# Patient Record
Sex: FEMALE | Race: WHITE | NOT HISPANIC OR LATINO | Employment: FULL TIME | URBAN - METROPOLITAN AREA
[De-identification: names, ages, dates, MRNs, and addresses within clinical notes are randomized per-mention and may not be internally consistent; named-entity substitution may affect disease eponyms.]

---

## 2023-10-17 ENCOUNTER — OFFICE VISIT (OUTPATIENT)
Dept: OBSTETRICS AND GYNECOLOGY | Age: 23
End: 2023-10-17
Payer: COMMERCIAL

## 2023-10-17 VITALS
SYSTOLIC BLOOD PRESSURE: 120 MMHG | WEIGHT: 161 LBS | HEIGHT: 66 IN | BODY MASS INDEX: 25.88 KG/M2 | DIASTOLIC BLOOD PRESSURE: 80 MMHG

## 2023-10-17 DIAGNOSIS — Z32.01 POSITIVE URINE PREGNANCY TEST: ICD-10-CM

## 2023-10-17 DIAGNOSIS — Z13.9 SPECIAL SCREENING: ICD-10-CM

## 2023-10-17 DIAGNOSIS — N92.6 MISSED MENSES: ICD-10-CM

## 2023-10-17 DIAGNOSIS — Z01.419 ENCOUNTER FOR GYNECOLOGICAL EXAMINATION WITHOUT ABNORMAL FINDING: Primary | ICD-10-CM

## 2023-10-17 DIAGNOSIS — Z12.4 SCREENING FOR MALIGNANT NEOPLASM OF THE CERVIX: ICD-10-CM

## 2023-10-17 DIAGNOSIS — Z11.3 SCREEN FOR STD (SEXUALLY TRANSMITTED DISEASE): ICD-10-CM

## 2023-10-17 LAB
B-HCG UR QL: POSITIVE
EXPIRATION DATE: ABNORMAL
INTERNAL NEGATIVE CONTROL: NEGATIVE
INTERNAL POSITIVE CONTROL: ABNORMAL
Lab: ABNORMAL

## 2023-10-17 NOTE — PROGRESS NOTES
"Jadiel Gardner is a 23 y.o. female presents as new patient here today for annual visit and to establish care ,last pap @ [facility] unknown date approximately 1 yr ago according to patient.  Previously on bcp and patch  did not like it - bloating . Periods are irregular  duration 4 days , patient is SA .Patient undecided on contraception , mom is a patient here with you .  Patient's pregnancy test is positive here today.  Ultrasound reveals a thickened endometrial stripe of 19 mm, normal adnexa, normal size uterus.  We will check a quant today and call patient with a plan of care.      History of Present Illness    The following portions of the patient's history were reviewed and updated as appropriate: allergies, current medications, past family history, past medical history, past social history, past surgical history, and problem list.    Review of Systems   Constitutional:  Negative for chills, fatigue and fever.   Gastrointestinal:  Negative for abdominal distention and abdominal pain.   Genitourinary:  Negative for dyspareunia, dysuria, menstrual problem, pelvic pain, vaginal bleeding, vaginal discharge and vaginal pain.   All other systems reviewed and are negative.    /80   Ht 167.6 cm (66\")   Wt 73 kg (161 lb)   LMP 08/24/2023 (Approximate)   BMI 25.99 kg/m²     Objective   Physical Exam  Vitals and nursing note reviewed.   Constitutional:       Appearance: Normal appearance. She is well-developed and normal weight.   Neck:      Thyroid: No thyromegaly.   Cardiovascular:      Rate and Rhythm: Normal rate.   Pulmonary:      Effort: Pulmonary effort is normal.   Chest:   Breasts:     Right: No mass, nipple discharge, skin change or tenderness.      Left: No mass, nipple discharge, skin change or tenderness.   Abdominal:      General: There is no distension.      Palpations: Abdomen is soft.      Tenderness: There is no abdominal tenderness.   Genitourinary:     General: Normal vulva.      " Exam position: Lithotomy position.      Labia:         Right: No rash or lesion.         Left: No rash or lesion.       Vagina: Normal. No vaginal discharge or bleeding.      Cervix: No friability, lesion or cervical bleeding.      Uterus: Not enlarged and not tender.       Adnexa:         Right: No mass or tenderness.          Left: No mass or tenderness.     Musculoskeletal:         General: Normal range of motion.      Cervical back: Normal range of motion.   Skin:     General: Skin is warm and dry.      Findings: No rash.   Neurological:      Mental Status: She is alert and oriented to person, place, and time.   Psychiatric:         Mood and Affect: Mood normal.         Behavior: Behavior normal.           Assessment & Plan   Diagnoses and all orders for this visit:    1. Encounter for gynecological examination without abnormal finding (Primary)    2. Screening for malignant neoplasm of the cervix  -     IGP, Rfx Aptima HPV ASCU    3. Special screening  -     POC Pregnancy, Urine    4. Missed menses  -     HCG, B-subunit, Quantitative ([facility] Only)  -     Type & Screen    5. Screen for STD (sexually transmitted disease)  -     NuSwab VG+ - Swab, Vagina    6. Positive urine pregnancy test        Counseling was given to patient and boyfriend  for the following topics: diagnostic results, instructions for management, impressions, importance of treatment compliance, and self breast exams  .   No follow-ups on file.

## 2023-10-18 DIAGNOSIS — N92.6 MISSED MENSES: Primary | ICD-10-CM

## 2023-10-18 LAB — HCG INTACT+B SERPL-ACNC: 683 MIU/ML

## 2023-10-18 NOTE — PROGRESS NOTES
Call patient and notify that she is pregnant.  Can she repeat her quant tomorrow in [place]?  Lab order is in.

## 2023-10-19 ENCOUNTER — LAB (OUTPATIENT)
Dept: OBSTETRICS AND GYNECOLOGY | Age: 23
End: 2023-10-19
Payer: COMMERCIAL

## 2023-10-19 DIAGNOSIS — N92.6 MISSED MENSES: ICD-10-CM

## 2023-10-19 LAB
A VAGINAE DNA VAG QL NAA+PROBE: NORMAL SCORE
BVAB2 DNA VAG QL NAA+PROBE: NORMAL SCORE
C ALBICANS DNA VAG QL NAA+PROBE: NEGATIVE
C GLABRATA DNA VAG QL NAA+PROBE: NEGATIVE
C TRACH DNA VAG QL NAA+PROBE: NEGATIVE
MEGA1 DNA VAG QL NAA+PROBE: NORMAL SCORE
N GONORRHOEA DNA VAG QL NAA+PROBE: NEGATIVE
T VAGINALIS DNA VAG QL NAA+PROBE: NEGATIVE

## 2023-10-20 LAB
CONV .: NORMAL
CYTOLOGIST CVX/VAG CYTO: NORMAL
CYTOLOGY CVX/VAG DOC CYTO: NORMAL
CYTOLOGY CVX/VAG DOC THIN PREP: NORMAL
DX ICD CODE: NORMAL
HCG INTACT+B SERPL-ACNC: 1651 MIU/ML
HIV 1 & 2 AB SER-IMP: NORMAL
Lab: NORMAL
OTHER STN SPEC: NORMAL
STAT OF ADQ CVX/VAG CYTO-IMP: NORMAL

## 2023-10-20 NOTE — PROGRESS NOTES
Call patient and notify that quant is rising appropriately.  Please schedule confirmation visit with NP and transvaginal ultrasound week of November 6

## 2023-10-23 ENCOUNTER — TELEPHONE (OUTPATIENT)
Dept: OBSTETRICS AND GYNECOLOGY | Age: 23
End: 2023-10-23
Payer: COMMERCIAL

## 2023-11-07 ENCOUNTER — TELEPHONE (OUTPATIENT)
Dept: OBSTETRICS AND GYNECOLOGY | Age: 23
End: 2023-11-07

## 2023-11-07 NOTE — TELEPHONE ENCOUNTER
Caller: Yumi Gardner    Relationship: Self    Best call back number: [phone]    What is the best time to reach you: ANYTIME    Who are you requesting to speak with (clinical staff, provider,  specific staff member): GORDON    Do you know the name of the person who called: GORDON    What was the call regarding: NOT SURE

## 2023-11-16 ENCOUNTER — OFFICE VISIT (OUTPATIENT)
Dept: OBSTETRICS AND GYNECOLOGY | Age: 23
End: 2023-11-16
Payer: COMMERCIAL

## 2023-11-16 VITALS
DIASTOLIC BLOOD PRESSURE: 68 MMHG | WEIGHT: 160 LBS | SYSTOLIC BLOOD PRESSURE: 118 MMHG | BODY MASS INDEX: 25.71 KG/M2 | HEIGHT: 66 IN

## 2023-11-16 DIAGNOSIS — R11.0 NAUSEA WITHOUT VOMITING: ICD-10-CM

## 2023-11-16 DIAGNOSIS — Z3A.08 8 WEEKS GESTATION OF PREGNANCY: Primary | ICD-10-CM

## 2023-11-16 RX ORDER — DOXYLAMINE SUCCINATE AND PYRIDOXINE HYDROCHLORIDE 20; 20 MG/1; MG/1
1 TABLET, EXTENDED RELEASE ORAL 2 TIMES DAILY
Qty: 60 TABLET | Refills: 3 | Status: SHIPPED | OUTPATIENT
Start: 2023-11-16

## 2023-11-16 NOTE — PROGRESS NOTES
[facility]   Obstetrics and Gynecology   New Gynecology Visit    2023    Patient: Yumi Gardner          MR#:[id number]    History of Present Illness    Chief Complaint   Patient presents with    Possible Pregnancy     Pregnancy confirmation, LMP unknown, no complaints       Patient plans to see Dr. Knox     23 y.o. female  who presents for confirmation and viability of pregnancy last pap 10/17/23 NIL does not want genetic testing has a history or irregular periods unsure of exact LMP  Shalom boyfriend fob  had a heart murmur found in the [employer] no complications  Hearing loss on maternal side 3 cousins are deaf  She is a   They have a cat at home  FOB's family planning gender reveal find out with anatomy  Taking some prenatal vitamins however they made her really nauseous she plans to try another one OTC  She also competes in [employer]     Studies reviewed:      Obstetric History:  OB History          0    Para   0    Term   0       0    AB   0    Living   0         SAB   0    IAB   0    Ectopic   0    Molar        Multiple   0    Live Births                   Menstrual History:     Patient's last menstrual period was 2023 (approximate).       Sexual History:         Social History:    ________________________________________  Patient Active Problem List   Diagnosis    Missed menses    Positive urine pregnancy test     Past Medical History:   Diagnosis Date    Anxiety 2016    Healthy adult on routine physical examination      Past Surgical History:   Procedure Laterality Date    NO PAST SURGERIES      TONSILLECTOMY      WISDOM TOOTH EXTRACTION Bilateral      Social History     Tobacco Use   Smoking Status Never   Smokeless Tobacco Never     Family History   Problem Relation Age of Onset    No Known Problems Father     No Known Problems Mother     Hearing loss Cousin     Hearing loss Cousin     Breast cancer Neg Hx     Ovarian cancer Neg Hx     Uterine cancer  "Neg Hx     Colon cancer Neg Hx      Prior to Admission medications    Not on File     ________________________________________    The following portions of the patient's history were reviewed and updated as appropriate: allergies, current medications, past family history, past medical history, past social history, past surgical history, and problem list.    Review of Systems         Objective     /68   Ht 167.6 cm (66\")   Wt 72.6 kg (160 lb)   LMP 08/24/2023 (Approximate)   BMI 25.82 kg/m²    BP Readings from Last 3 Encounters:   11/16/23 118/68   10/17/23 120/80   05/12/23 158/89      Wt Readings from Last 3 Encounters:   11/16/23 72.6 kg (160 lb)   10/17/23 73 kg (161 lb)   05/12/23 63.5 kg (140 lb)        BMI: Estimated body mass index is 25.82 kg/m² as calculated from the following:    Height as of this encounter: 167.6 cm (66\").    Weight as of this encounter: 72.6 kg (160 lb).    Physical Exam            Assessment:  Diagnoses and all orders for this visit:    1. 8 weeks gestation of pregnancy (Primary)  -     ABO / Rh  -     RPR  -     Hepatitis B Surface Antigen  -     Rubella Antibody, IgG  -     Hepatitis C Antibody  -     Antibody Screen  -     Varicella Zoster Antibody, IgG  -     CBC (No Diff)  -     Hemoglobin A1c  -     HIV-1 / O / 2 Ag / Antibody  -     Hemoglobinopathy Fractionation Cascade  -     Chlamydia trachomatis, Neisseria gonorrhoeae, Trichomonas vaginalis, PCR - Swab, Vagina  -     Urine Culture - Urine, Urine, Clean Catch    2. Nausea without vomiting  -     doxylamine-pyridoxine ER (Bonjesta) 20-20 MG tablet controlled-release tablet; Take 1 tablet by mouth 2 (Two) Times a Day.  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 Discussed that the COVID and Flu vaccine is safe and recommended in pregnancy.   SAB warnings reviewed  All questions answered  No follow-ups on file.      Jyoti Olivia, APRN  11/16/2023 15:47 EST

## 2023-11-17 LAB
ABO GROUP BLD: NORMAL
BLD GP AB SCN SERPL QL: NEGATIVE
ERYTHROCYTE [DISTWIDTH] IN BLOOD BY AUTOMATED COUNT: 12.6 % (ref 11.7–15.4)
HBA1C MFR BLD: 5.3 % (ref 4.8–5.6)
HBV SURFACE AG SERPL QL IA: NEGATIVE
HCT VFR BLD AUTO: 38.5 % (ref 34–46.6)
HCV IGG SERPL QL IA: NON REACTIVE
HGB A MFR BLD ELPH: 97.4 % (ref 96.4–98.8)
HGB A2 MFR BLD ELPH: 2.6 % (ref 1.8–3.2)
HGB BLD-MCNC: 12.9 G/DL (ref 11.1–15.9)
HGB F MFR BLD ELPH: 0 % (ref 0–2)
HGB FRACT BLD-IMP: NORMAL
HGB S MFR BLD ELPH: 0 %
HIV 1+2 AB+HIV1 P24 AG SERPL QL IA: NON REACTIVE
MCH RBC QN AUTO: 29.5 PG (ref 26.6–33)
MCHC RBC AUTO-ENTMCNC: 33.5 G/DL (ref 31.5–35.7)
MCV RBC AUTO: 88 FL (ref 79–97)
PLATELET # BLD AUTO: 314 X10E3/UL (ref 150–450)
RBC # BLD AUTO: 4.38 X10E6/UL (ref 3.77–5.28)
RH BLD: POSITIVE
RPR SER QL: NON REACTIVE
RUBV IGG SERPL IA-ACNC: <0.9 INDEX
VZV IGG SER IA-ACNC: <135 INDEX
WBC # BLD AUTO: 9.3 X10E3/UL (ref 3.4–10.8)

## 2023-11-18 LAB
BACTERIA UR CULT: NORMAL
BACTERIA UR CULT: NORMAL

## 2023-11-20 LAB
C TRACH RRNA SPEC QL NAA+PROBE: NEGATIVE
N GONORRHOEA RRNA SPEC QL NAA+PROBE: NEGATIVE
T VAGINALIS RRNA SPEC QL NAA+PROBE: NEGATIVE

## 2023-11-27 ENCOUNTER — TELEPHONE (OUTPATIENT)
Dept: OBSTETRICS AND GYNECOLOGY | Age: 23
End: 2023-11-27
Payer: COMMERCIAL

## 2023-11-27 NOTE — TELEPHONE ENCOUNTER
Would encourage her to rest and drink water.  In the first trimester cramping can be normal, if severe can go to ER.  If any vaginal bleeding please call us back.

## 2023-11-27 NOTE — TELEPHONE ENCOUNTER
Patient is currently approximately 9 weeks pregnant and states she is at work and trying to subdue a dog and started getting really sharp pains in her lower left side.  She denies any vaginal bleeding.  Pharmacy has been verified.  Please advise.

## 2023-11-27 NOTE — TELEPHONE ENCOUNTER
Patient informed and voices understanding.  She states the sharp pain has improved now it is mild cramping.

## 2023-12-12 ENCOUNTER — INITIAL PRENATAL (OUTPATIENT)
Dept: OBSTETRICS AND GYNECOLOGY | Age: 23
End: 2023-12-12
Payer: COMMERCIAL

## 2023-12-12 VITALS — BODY MASS INDEX: 25.82 KG/M2 | DIASTOLIC BLOOD PRESSURE: 66 MMHG | WEIGHT: 160 LBS | SYSTOLIC BLOOD PRESSURE: 104 MMHG

## 2023-12-12 DIAGNOSIS — O09.899 MATERNAL VARICELLA, NON-IMMUNE: ICD-10-CM

## 2023-12-12 DIAGNOSIS — O09.899 RUBELLA NON-IMMUNE STATUS, ANTEPARTUM: ICD-10-CM

## 2023-12-12 DIAGNOSIS — Z13.89 SCREENING FOR BLOOD OR PROTEIN IN URINE: ICD-10-CM

## 2023-12-12 DIAGNOSIS — Z34.01 ENCOUNTER FOR PRENATAL CARE IN FIRST TRIMESTER OF FIRST PREGNANCY: Primary | ICD-10-CM

## 2023-12-12 DIAGNOSIS — Z28.39 RUBELLA NON-IMMUNE STATUS, ANTEPARTUM: ICD-10-CM

## 2023-12-12 DIAGNOSIS — Z28.39 MATERNAL VARICELLA, NON-IMMUNE: ICD-10-CM

## 2023-12-12 PROBLEM — Z32.01 POSITIVE URINE PREGNANCY TEST: Status: RESOLVED | Noted: 2023-10-17 | Resolved: 2023-12-12

## 2023-12-12 PROBLEM — N92.6 MISSED MENSES: Status: RESOLVED | Noted: 2023-10-17 | Resolved: 2023-12-12

## 2023-12-12 LAB
BILIRUB BLD-MCNC: NEGATIVE MG/DL
GLUCOSE UR STRIP-MCNC: NEGATIVE MG/DL
KETONES UR QL: ABNORMAL
LEUKOCYTE EST, POC: NEGATIVE
NITRITE UR-MCNC: NEGATIVE MG/ML
PH UR: 6 [PH] (ref 5–8)
PROT UR STRIP-MCNC: NEGATIVE MG/DL
RBC # UR STRIP: NEGATIVE /UL
SP GR UR: 1.01 (ref 1–1.03)
UROBILINOGEN UR QL: NORMAL

## 2023-12-12 RX ORDER — PRENATAL VIT NO.126/IRON/FOLIC 28MG-0.8MG
TABLET ORAL DAILY
COMMUNITY

## 2023-12-12 NOTE — PROGRESS NOTES
"Chief Complaint   Patient presents with    Routine Prenatal Visit     Cc\" ob intake , LMP unknown MATHEW based on Us 24 , last pap 10/17/23 neg , declines flu vaccine , low appetite some nausea and vomiting , not taking prenatals she tried some but were makes her sick samples given to patient to try  , not interested in jerod today . Partner [place] here with patient .        HPI: 23 y.o.  at 12w2d resents for OB intake    Vitals:    23 1048   BP: 104/66   Weight: 72.6 kg (160 lb)     Total weight gain for pregnancy:  0 kg (0 lb)    Review of systems:   Gen: negative  CV:     negative  GI: negative  :   negative  MS:    negative  Neuro: negative  Pul: negative    A/P  1. Intrauterine pregnancy at 12w2d   2. Pregnancy Risk:  NORMAL        Diagnoses and all orders for this visit:    1. Encounter for prenatal care in first trimester of first pregnancy (Primary)    2. Screening for blood or protein in urine  -     POC Urinalysis Dipstick    3. Maternal varicella, non-immune    4. Rubella non-immune status, antepartum        Nutrition and weight gain were addressed.  Practice OB call structure was discussed.   -----------------------  PLAN:   Return in about 4 weeks (around 2024), or ob check with NP.  OB intake completed  Prenatal labs reviewed  Declines MARGA Knox MD  2023 13:04 EST    "

## 2024-01-09 ENCOUNTER — TELEPHONE (OUTPATIENT)
Dept: OBSTETRICS AND GYNECOLOGY | Age: 24
End: 2024-01-09
Payer: COMMERCIAL

## 2024-01-09 NOTE — TELEPHONE ENCOUNTER
Patient is currently 16weeks 2 days pregnant and states the past two nights she has woke up feeling like she is suffocating, having trouble breathing (like a rock is sitting on her chest) and dizzy.  She states that she has also noticed that while she is active doing things like unloading , laundry, etc. she gets short of breath.  She denies any headaches, no chest pain or discomfort.  She states she does have a history of anxiety during teenage years and is currently on no treatment.  Please advise. Pharmacy has been verified.

## 2024-01-09 NOTE — TELEPHONE ENCOUNTER
I would recommend evaluation in the ER for shortness of breath and shortness of breath on exertion to rule out any serious causes.

## 2024-01-09 NOTE — TELEPHONE ENCOUNTER
Patient called back and was instructed to go to ER for evaluation and patient voices understanding

## 2024-01-11 ENCOUNTER — ROUTINE PRENATAL (OUTPATIENT)
Dept: OBSTETRICS AND GYNECOLOGY | Age: 24
End: 2024-01-11
Payer: COMMERCIAL

## 2024-01-11 VITALS — BODY MASS INDEX: 25.66 KG/M2 | DIASTOLIC BLOOD PRESSURE: 64 MMHG | SYSTOLIC BLOOD PRESSURE: 110 MMHG | WEIGHT: 159 LBS

## 2024-01-11 DIAGNOSIS — N89.8 VAGINAL DISCHARGE DURING PREGNANCY IN SECOND TRIMESTER: ICD-10-CM

## 2024-01-11 DIAGNOSIS — Z3A.16 16 WEEKS GESTATION OF PREGNANCY: Primary | ICD-10-CM

## 2024-01-11 DIAGNOSIS — N89.8 VAGINAL ODOR: ICD-10-CM

## 2024-01-11 DIAGNOSIS — O26.892 VAGINAL DISCHARGE DURING PREGNANCY IN SECOND TRIMESTER: ICD-10-CM

## 2024-01-11 DIAGNOSIS — F41.9 ANXIETY: ICD-10-CM

## 2024-01-11 DIAGNOSIS — Z13.89 SCREENING FOR BLOOD OR PROTEIN IN URINE: ICD-10-CM

## 2024-01-11 LAB
BILIRUB BLD-MCNC: NEGATIVE MG/DL
CLARITY, POC: CLEAR
COLOR UR: YELLOW
GLUCOSE UR STRIP-MCNC: NEGATIVE MG/DL
KETONES UR QL: NEGATIVE
LEUKOCYTE EST, POC: ABNORMAL
NITRITE UR-MCNC: NEGATIVE MG/ML
PH UR: 8.5 [PH] (ref 5–8)
PROT UR STRIP-MCNC: NEGATIVE MG/DL
RBC # UR STRIP: NEGATIVE /UL
SP GR UR: 1.02 (ref 1–1.03)
UROBILINOGEN UR QL: ABNORMAL

## 2024-01-11 RX ORDER — CHOLECALCIFEROL (VITAMIN D3) 25 MCG
1 TABLET,CHEWABLE ORAL DAILY
Qty: 30 CAPSULE | Refills: 11 | Status: SHIPPED | OUTPATIENT
Start: 2024-01-11 | End: 2024-01-11 | Stop reason: SDUPTHER

## 2024-01-11 RX ORDER — CHOLECALCIFEROL (VITAMIN D3) 25 MCG
1 TABLET,CHEWABLE ORAL DAILY
Qty: 30 CAPSULE | Refills: 11 | Status: SHIPPED | OUTPATIENT
Start: 2024-01-11

## 2024-01-11 NOTE — PROGRESS NOTES
Chief Complaint   Patient presents with    Routine Prenatal Visit     CC: ob check, 16w4d, PNV make her sick       HPI: 23 y.o.  at 16w4d   She is here with her partner today  Her prenatals are making her nauseous this is the second when she has tried  She has noticed some vaginal odor and discharge recently for the past 3 weeks  She has had some bouts of anxiety has had some situational things going on her life  No vaginal bleeding no loss of fluid no pain    Vitals:    24 1103   BP: 110/64   Weight: 72.1 kg (159 lb)     Total weight gain for pregnancy:  -0.454 kg (-1 lb)    Review of systems:   Gen: negative  CV:     negative  GI: nausea  :   vaginal discharge and vaginal odor   MS:    negative  Neuro: denies headaches and visual changes   Pul: negative    A/P  1. Intrauterine pregnancy at 16w4d   2. Pregnancy Risk:  NORMAL    Physical Exam    General:   alert   Heart: Not performed today   Lungs: Not performed today.   Breast: Not performed today   Neck: Not performed today   Abdomen: Not performed today   CVA: Not performed today   Pelvis: Vulva and vagina appear normal. Bimanual exam reveals normal uterus and adnexa.  Vaginal: discharge, white   Extremities: Extremities normal, atraumatic, no cyanosis or edema   Neurologic: AOx3. Gait normal. Reflexes and motor strength normal and symmetric. Cranial nerves 2-12 and sensation grossly intact.   Psychiatric: anxious       Diagnoses and all orders for this visit:    1. 16 weeks gestation of pregnancy (Primary)  -     Discontinue: Prenatal Vit-Fe Sulfate-FA-DHA (Prenatal Vitamin/Min +DHA) 27-0.8-200 MG capsule; Take 1 tablet by mouth Daily.  Dispense: 30 capsule; Refill: 11  -     Prenatal Vit-Fe Sulfate-FA-DHA (Prenatal Vitamin/Min +DHA) 27-0.8-200 MG capsule; Take 1 tablet by mouth Daily.  Dispense: 30 capsule; Refill: 11    2. Vaginal discharge during pregnancy in second trimester  -     NuSwab VG+ - Swab, Vagina    3. Vaginal odor  -     NuSwab  VG+ - Swab, Vagina    4. Screening for blood or protein in urine  -     POC Urinalysis Dipstick    5. Anxiety         labor was discussed.  Warnings were provided.  Nutrition and weight gain were addressed.  -----------------------  PLAN: I have sent in a different prenatal vitamin encouraged her to eat with these I offered medication for nausea she declines at this time could do Unisom and B6 over-the-counter we discussed anxiety offered medication and therapy she plans to wait for now will let me know denies SI sab precautions reviewed   Anatomy with next appt  Return in about 26 days (around 2024) for Next scheduled follow up.      Jyoti Olivia, APRN  2024 11:37 EST

## 2024-02-06 ENCOUNTER — ROUTINE PRENATAL (OUTPATIENT)
Dept: OBSTETRICS AND GYNECOLOGY | Age: 24
End: 2024-02-06
Payer: COMMERCIAL

## 2024-02-06 VITALS — WEIGHT: 164 LBS | BODY MASS INDEX: 26.47 KG/M2 | DIASTOLIC BLOOD PRESSURE: 64 MMHG | SYSTOLIC BLOOD PRESSURE: 102 MMHG

## 2024-02-06 DIAGNOSIS — O28.3 ABNORMAL FETAL ULTRASOUND: ICD-10-CM

## 2024-02-06 DIAGNOSIS — O09.899 MATERNAL VARICELLA, NON-IMMUNE: ICD-10-CM

## 2024-02-06 DIAGNOSIS — Z13.89 SCREENING FOR BLOOD OR PROTEIN IN URINE: ICD-10-CM

## 2024-02-06 DIAGNOSIS — Z28.39 MATERNAL VARICELLA, NON-IMMUNE: ICD-10-CM

## 2024-02-06 DIAGNOSIS — Z28.39 RUBELLA NON-IMMUNE STATUS, ANTEPARTUM: ICD-10-CM

## 2024-02-06 DIAGNOSIS — O09.899 RUBELLA NON-IMMUNE STATUS, ANTEPARTUM: ICD-10-CM

## 2024-02-06 DIAGNOSIS — Z34.02 ENCOUNTER FOR PRENATAL CARE IN SECOND TRIMESTER OF FIRST PREGNANCY: Primary | ICD-10-CM

## 2024-02-06 PROBLEM — Z34.90 PREGNANCY: Status: ACTIVE | Noted: 2024-02-06

## 2024-02-06 LAB
BILIRUB BLD-MCNC: NEGATIVE MG/DL
GLUCOSE UR STRIP-MCNC: NEGATIVE MG/DL
KETONES UR QL: NEGATIVE
LEUKOCYTE EST, POC: NEGATIVE
NITRITE UR-MCNC: NEGATIVE MG/ML
PH UR: 6.5 [PH] (ref 5–8)
PROT UR STRIP-MCNC: NEGATIVE MG/DL
RBC # UR STRIP: NEGATIVE /UL
SP GR UR: 1.01 (ref 1–1.03)
UROBILINOGEN UR QL: NORMAL

## 2024-02-06 NOTE — PROGRESS NOTES
Chief Complaint   Patient presents with    Routine Prenatal Visit     Cc: ob check with anatomy scan today , doing well , does not know the gender will have the reveal this weekend , does not take PNV . Pt denies any VB , no ob complaints        HPI: 23 y.o.  at 20w2d presents  for prenatal care    Vitals:    24 1310   BP: 102/64   Weight: 74.4 kg (164 lb)     Total weight gain for pregnancy:  1.814 kg (4 lb)    Review of systems:   Gen: negative  CV:     negative  GI: negative  :   negative and good fetal movement noted   MS:    negative  Neuro: negative  Pul: negative    A/P  1. Intrauterine pregnancy at 20w2d   2. Pregnancy Risk:  NORMAL        Diagnoses and all orders for this visit:    1. Encounter for prenatal care in second trimester of first pregnancy (Primary)    2. Screening for blood or protein in urine  -     POC Urinalysis Dipstick    3. Rubella non-immune status, antepartum    4. Maternal varicella, non-immune    5. Incomplete fetal anatomy        Nutrition and weight gain were addressed.  -----------------------  PLAN:   Return in about 4 weeks (around 3/5/2024), or ob check and repeat anatomy US.  Incomplete anatomy - repeat 4 weeks   Encouraged prenatal vitamin daily  Encouraged increased water    yJoti Knox MD  2024 13:49 EST

## 2024-03-05 ENCOUNTER — ROUTINE PRENATAL (OUTPATIENT)
Dept: OBSTETRICS AND GYNECOLOGY | Age: 24
End: 2024-03-05
Payer: COMMERCIAL

## 2024-03-05 VITALS — DIASTOLIC BLOOD PRESSURE: 68 MMHG | BODY MASS INDEX: 27.6 KG/M2 | SYSTOLIC BLOOD PRESSURE: 110 MMHG | WEIGHT: 171 LBS

## 2024-03-05 DIAGNOSIS — O09.899 MATERNAL VARICELLA, NON-IMMUNE: ICD-10-CM

## 2024-03-05 DIAGNOSIS — O09.899 RUBELLA NON-IMMUNE STATUS, ANTEPARTUM: ICD-10-CM

## 2024-03-05 DIAGNOSIS — Z13.89 SCREENING FOR BLOOD OR PROTEIN IN URINE: ICD-10-CM

## 2024-03-05 DIAGNOSIS — Z28.39 RUBELLA NON-IMMUNE STATUS, ANTEPARTUM: ICD-10-CM

## 2024-03-05 DIAGNOSIS — Z28.39 MATERNAL VARICELLA, NON-IMMUNE: ICD-10-CM

## 2024-03-05 DIAGNOSIS — O28.3 ABNORMAL FETAL ULTRASOUND: ICD-10-CM

## 2024-03-05 DIAGNOSIS — Z34.02 ENCOUNTER FOR PRENATAL CARE IN SECOND TRIMESTER OF FIRST PREGNANCY: Primary | ICD-10-CM

## 2024-03-05 LAB
BILIRUB BLD-MCNC: NEGATIVE MG/DL
GLUCOSE UR STRIP-MCNC: NEGATIVE MG/DL
KETONES UR QL: NEGATIVE
LEUKOCYTE EST, POC: ABNORMAL
NITRITE UR-MCNC: NEGATIVE MG/ML
PH UR: 7 [PH] (ref 5–8)
PROT UR STRIP-MCNC: NEGATIVE MG/DL
RBC # UR STRIP: NEGATIVE /UL
SP GR UR: 1.01 (ref 1–1.03)
UROBILINOGEN UR QL: NORMAL

## 2024-03-05 PROCEDURE — 0502F SUBSEQUENT PRENATAL CARE: CPT | Performed by: OBSTETRICS & GYNECOLOGY

## 2024-03-05 NOTE — PROGRESS NOTES
Chief Complaint   Patient presents with    Routine Prenatal Visit     Cc:  ob check with repeat anatomy , feeling great no ob complaints denies any VB . Patient knows the gender - girl !       HPI: 23 y.o.  at 24w2d presents for prenatal care      Vitals:    24 1122   BP: 110/68   Weight: 77.6 kg (171 lb)     Total weight gain for pregnancy:  4.99 kg (11 lb)    Review of systems:   Gen: negative  CV:     negative  GI: negative  :   negative and good fetal movement noted   MS:    negative  Neuro: negative  Pul: negative    A/P  1. Intrauterine pregnancy at 24w2d   2. Pregnancy Risk:  NORMAL        Diagnoses and all orders for this visit:    1. Encounter for prenatal care in second trimester of first pregnancy (Primary)    2. Screening for blood or protein in urine  -     POC Urinalysis Dipstick    3. Rubella non-immune status, antepartum    4. Maternal varicella, non-immune    5. Incomplete fetal anatomy        Nutrition and weight gain were addressed.  -----------------------  PLAN:   Return in about 27 days (around 2024), or ob check and one-hour gtt.  Normal completed anatomy today  Normal fetal growth  Varicella and rubella nonimmune-VAX postpartum    Jyoti Knox MD  3/5/2024 11:38 EST

## 2024-04-01 ENCOUNTER — ROUTINE PRENATAL (OUTPATIENT)
Dept: OBSTETRICS AND GYNECOLOGY | Age: 24
End: 2024-04-01
Payer: COMMERCIAL

## 2024-04-01 VITALS — WEIGHT: 175.2 LBS | BODY MASS INDEX: 28.28 KG/M2 | SYSTOLIC BLOOD PRESSURE: 124 MMHG | DIASTOLIC BLOOD PRESSURE: 62 MMHG

## 2024-04-01 DIAGNOSIS — Z13.1 SPECIAL SCREENING EXAMINATION FOR DIABETES MELLITUS: ICD-10-CM

## 2024-04-01 DIAGNOSIS — Z13.89 SCREENING FOR BLOOD OR PROTEIN IN URINE: ICD-10-CM

## 2024-04-01 DIAGNOSIS — Z28.39 RUBELLA NON-IMMUNE STATUS, ANTEPARTUM: ICD-10-CM

## 2024-04-01 DIAGNOSIS — O09.899 MATERNAL VARICELLA, NON-IMMUNE: ICD-10-CM

## 2024-04-01 DIAGNOSIS — Z28.39 MATERNAL VARICELLA, NON-IMMUNE: ICD-10-CM

## 2024-04-01 DIAGNOSIS — Z3A.28 28 WEEKS GESTATION OF PREGNANCY: Primary | ICD-10-CM

## 2024-04-01 DIAGNOSIS — O09.899 RUBELLA NON-IMMUNE STATUS, ANTEPARTUM: ICD-10-CM

## 2024-04-01 LAB
BILIRUB BLD-MCNC: NEGATIVE MG/DL
CLARITY, POC: CLEAR
COLOR UR: YELLOW
GLUCOSE UR STRIP-MCNC: NEGATIVE MG/DL
KETONES UR QL: NEGATIVE
LEUKOCYTE EST, POC: ABNORMAL
NITRITE UR-MCNC: NEGATIVE MG/ML
PH UR: 7.5 [PH] (ref 5–8)
PROT UR STRIP-MCNC: NEGATIVE MG/DL
RBC # UR STRIP: NEGATIVE /UL
SP GR UR: 1.02 (ref 1–1.03)
UROBILINOGEN UR QL: NORMAL

## 2024-04-01 NOTE — PROGRESS NOTES
Chief Complaint   Patient presents with    Routine Prenatal Visit     Ob check, 28w1d, 1 hr gtt, cbc, and rpr today, tdap today, pt states she's started having swelling in her legs, feet, and knees for about the last two weeks and she has been feeling like baby is pushing her head down really hard        HPI: 23 y.o.  at 28w1d doing well  +fm, no lof or contractions  She is on the fence about the tdap today    Vitals:    24 0924 24 0949   BP: 132/80 124/62   Weight: 79.5 kg (175 lb 3.2 oz)      Total weight gain for pregnancy:  6.895 kg (15 lb 3.2 oz)      A/P  1. Intrauterine pregnancy at 28w1d   2. Pregnancy Risk:  NORMAL    Blood pressure is out of parameters.      Diagnoses and all orders for this visit:    1. 28 weeks gestation of pregnancy (Primary)    2. Screening for blood or protein in urine  -     POC Urinalysis Dipstick    3. Special screening examination for diabetes mellitus  -     Cancel: Gestational Diabetes Screen 1 Hour; Future  -     CBC (No Diff); Future  -     RPR; Future  -     RPR  -     CBC (No Diff)  -     Cancel: Gestational Diabetes Screen 1 Hour  -     Gestational Screen 1 Hr ([facility])    4. Rubella non-immune status, antepartum    5. Maternal varicella, non-immune         labor was discussed.  Warnings were provided.  Nutrition and weight gain were addressed.  -----------------------  PLAN: increase fluids elevate feet could try compression socks   Information about tdap given  Return in about 2 weeks (around 4/15/2024).      Jyoti Olivia, APRN  2024 11:49 EDT

## 2024-04-02 LAB
ERYTHROCYTE [DISTWIDTH] IN BLOOD BY AUTOMATED COUNT: 12.7 % (ref 11.7–15.4)
GLUCOSE 1H P 50 G GLC PO SERPL-MCNC: 118 MG/DL (ref 70–139)
HCT VFR BLD AUTO: 34.1 % (ref 34–46.6)
HGB BLD-MCNC: 11.1 G/DL (ref 11.1–15.9)
MCH RBC QN AUTO: 29.9 PG (ref 26.6–33)
MCHC RBC AUTO-ENTMCNC: 32.6 G/DL (ref 31.5–35.7)
MCV RBC AUTO: 92 FL (ref 79–97)
PLATELET # BLD AUTO: 260 X10E3/UL (ref 150–450)
RBC # BLD AUTO: 3.71 X10E6/UL (ref 3.77–5.28)
RPR SER QL: NON REACTIVE
WBC # BLD AUTO: 12.4 X10E3/UL (ref 3.4–10.8)

## 2024-04-16 ENCOUNTER — ROUTINE PRENATAL (OUTPATIENT)
Dept: OBSTETRICS AND GYNECOLOGY | Age: 24
End: 2024-04-16
Payer: COMMERCIAL

## 2024-04-16 VITALS — WEIGHT: 181 LBS | DIASTOLIC BLOOD PRESSURE: 80 MMHG | BODY MASS INDEX: 29.21 KG/M2 | SYSTOLIC BLOOD PRESSURE: 110 MMHG

## 2024-04-16 DIAGNOSIS — Z13.89 SCREENING FOR BLOOD OR PROTEIN IN URINE: ICD-10-CM

## 2024-04-16 DIAGNOSIS — O09.899 RUBELLA NON-IMMUNE STATUS, ANTEPARTUM: ICD-10-CM

## 2024-04-16 DIAGNOSIS — Z34.03 ENCOUNTER FOR PRENATAL CARE IN THIRD TRIMESTER OF FIRST PREGNANCY: Primary | ICD-10-CM

## 2024-04-16 DIAGNOSIS — Z28.39 MATERNAL VARICELLA, NON-IMMUNE: ICD-10-CM

## 2024-04-16 DIAGNOSIS — Z28.39 RUBELLA NON-IMMUNE STATUS, ANTEPARTUM: ICD-10-CM

## 2024-04-16 DIAGNOSIS — O09.899 MATERNAL VARICELLA, NON-IMMUNE: ICD-10-CM

## 2024-04-16 LAB
BILIRUB BLD-MCNC: NEGATIVE MG/DL
GLUCOSE UR STRIP-MCNC: NEGATIVE MG/DL
KETONES UR QL: NEGATIVE
LEUKOCYTE EST, POC: NEGATIVE
NITRITE UR-MCNC: NEGATIVE MG/ML
PH UR: 7.5 [PH] (ref 5–8)
PROT UR STRIP-MCNC: NEGATIVE MG/DL
RBC # UR STRIP: NEGATIVE /UL
SP GR UR: 1.02 (ref 1–1.03)
UROBILINOGEN UR QL: NORMAL

## 2024-04-16 PROCEDURE — 0502F SUBSEQUENT PRENATAL CARE: CPT | Performed by: OBSTETRICS & GYNECOLOGY

## 2024-04-16 NOTE — PROGRESS NOTES
Chief Complaint   Patient presents with    Routine Prenatal Visit     Cc: ob check , feet swelling not using compression socks will get some this week  , reports good FM , reports having few episodes of having rash upper chest and under breast , some itching associated with it but only under breast .patient did not  not try any benadryl or allergy medicine , partner had a similar rash once but believes  was laundry detergent .       HPI: 23 y.o.  at 30w2d presents for prenatal care    Last OB US growth         Value Time User    EFW%ILE  47%ile 3/5/2024 11:35 AM Jyoti Knox MD    AC%ILE  56%ile 3/5/2024 11:35 AM Jyoti Knox MD    NIPT  Declined 3/5/2024 11:42 AM Jyoti Knox MD    FETAL SURVEY  NL/Complete 3/5/2024 11:35 AM Jyoti Knox MD          Vitals:    24 1012   BP: 110/80   Weight: 82.1 kg (181 lb)     Total weight gain for pregnancy:  9.526 kg (21 lb)    Review of systems:   Gen: negative  CV:     negative  GI: negative  :   negative and good fetal movement noted   MS:    negative  Neuro: negative  Pul: negative    A/P  1. Intrauterine pregnancy at 30w2d   2. Pregnancy Risk:  NORMAL        Diagnoses and all orders for this visit:    1. Encounter for prenatal care in third trimester of first pregnancy (Primary)    2. Screening for blood or protein in urine  -     POC Urinalysis Dipstick    3. Rubella non-immune status, antepartum    4. Maternal varicella, non-immune         labor was discussed.  Warnings were provided.  Nutrition and weight gain were addressed.  -----------------------  PLAN:   Return in about 2 weeks (around 2024), or ob check.  Size equal to dates  Rubella nonimmune and varicella nonimmune-recommend VAX postpartum   labor warnings  Recommend powder for under breast    Jyoti Knox MD  2024 15:29 EDT

## 2024-05-07 ENCOUNTER — ROUTINE PRENATAL (OUTPATIENT)
Dept: OBSTETRICS AND GYNECOLOGY | Age: 24
End: 2024-05-07
Payer: COMMERCIAL

## 2024-05-07 VITALS — DIASTOLIC BLOOD PRESSURE: 80 MMHG | BODY MASS INDEX: 29.38 KG/M2 | WEIGHT: 182 LBS | SYSTOLIC BLOOD PRESSURE: 108 MMHG

## 2024-05-07 DIAGNOSIS — Z28.39 RUBELLA NON-IMMUNE STATUS, ANTEPARTUM: ICD-10-CM

## 2024-05-07 DIAGNOSIS — Z28.39 MATERNAL VARICELLA, NON-IMMUNE: ICD-10-CM

## 2024-05-07 DIAGNOSIS — Z34.03 ENCOUNTER FOR PRENATAL CARE IN THIRD TRIMESTER OF FIRST PREGNANCY: Primary | ICD-10-CM

## 2024-05-07 DIAGNOSIS — O09.899 MATERNAL VARICELLA, NON-IMMUNE: ICD-10-CM

## 2024-05-07 DIAGNOSIS — O09.899 RUBELLA NON-IMMUNE STATUS, ANTEPARTUM: ICD-10-CM

## 2024-05-07 DIAGNOSIS — Z13.89 SCREENING FOR BLOOD OR PROTEIN IN URINE: ICD-10-CM

## 2024-05-07 DIAGNOSIS — O26.849 UTERINE SIZE DATE DISCREPANCY PREGNANCY: ICD-10-CM

## 2024-05-07 LAB
BILIRUB BLD-MCNC: NEGATIVE MG/DL
GLUCOSE UR STRIP-MCNC: NEGATIVE MG/DL
KETONES UR QL: NEGATIVE
LEUKOCYTE EST, POC: ABNORMAL
NITRITE UR-MCNC: NEGATIVE MG/ML
PH UR: 6.5 [PH] (ref 5–8)
PROT UR STRIP-MCNC: NEGATIVE MG/DL
RBC # UR STRIP: NEGATIVE /UL
SP GR UR: 1.02 (ref 1–1.03)
UROBILINOGEN UR QL: NORMAL

## 2024-05-07 PROCEDURE — 0502F SUBSEQUENT PRENATAL CARE: CPT | Performed by: OBSTETRICS & GYNECOLOGY

## 2024-05-21 ENCOUNTER — HOSPITAL ENCOUNTER (OUTPATIENT)
Facility: HOSPITAL | Age: 24
Discharge: HOME | End: 2024-05-21
Attending: OBSTETRICS & GYNECOLOGY | Admitting: OBSTETRICS & GYNECOLOGY
Payer: COMMERCIAL

## 2024-05-21 ENCOUNTER — ROUTINE PRENATAL (OUTPATIENT)
Dept: OBSTETRICS AND GYNECOLOGY | Age: 24
End: 2024-05-21
Payer: COMMERCIAL

## 2024-05-21 VITALS
RESPIRATION RATE: 18 BRPM | DIASTOLIC BLOOD PRESSURE: 73 MMHG | SYSTOLIC BLOOD PRESSURE: 121 MMHG | TEMPERATURE: 98.9 F | HEART RATE: 88 BPM | OXYGEN SATURATION: 100 %

## 2024-05-21 VITALS — WEIGHT: 190 LBS | BODY MASS INDEX: 30.67 KG/M2 | SYSTOLIC BLOOD PRESSURE: 126 MMHG | DIASTOLIC BLOOD PRESSURE: 85 MMHG

## 2024-05-21 DIAGNOSIS — Z28.39 RUBELLA NON-IMMUNE STATUS, ANTEPARTUM: ICD-10-CM

## 2024-05-21 DIAGNOSIS — Z28.39 MATERNAL VARICELLA, NON-IMMUNE: ICD-10-CM

## 2024-05-21 DIAGNOSIS — O26.849 UTERINE SIZE DATE DISCREPANCY PREGNANCY: ICD-10-CM

## 2024-05-21 DIAGNOSIS — O09.899 RUBELLA NON-IMMUNE STATUS, ANTEPARTUM: ICD-10-CM

## 2024-05-21 DIAGNOSIS — O09.899 MATERNAL VARICELLA, NON-IMMUNE: ICD-10-CM

## 2024-05-21 DIAGNOSIS — Z34.03 ENCOUNTER FOR PRENATAL CARE IN THIRD TRIMESTER OF FIRST PREGNANCY: Primary | ICD-10-CM

## 2024-05-21 DIAGNOSIS — Z13.89 SCREENING FOR BLOOD OR PROTEIN IN URINE: ICD-10-CM

## 2024-05-21 LAB
ALBUMIN SERPL-MCNC: 2.9 G/DL (ref 3.5–5.2)
ALBUMIN/GLOB SERPL: 0.9 G/DL
ALP SERPL-CCNC: 130 U/L (ref 39–117)
ALT SERPL W P-5'-P-CCNC: 11 U/L (ref 1–33)
ANION GAP SERPL CALCULATED.3IONS-SCNC: 12.2 MMOL/L (ref 5–15)
AST SERPL-CCNC: 16 U/L (ref 1–32)
BASOPHILS # BLD AUTO: 0.02 10*3/MM3 (ref 0–0.2)
BASOPHILS NFR BLD AUTO: 0.2 % (ref 0–1.5)
BILIRUB BLD-MCNC: NEGATIVE MG/DL
BILIRUB SERPL-MCNC: <0.2 MG/DL (ref 0–1.2)
BUN SERPL-MCNC: 5 MG/DL (ref 6–20)
BUN/CREAT SERPL: 10 (ref 7–25)
CALCIUM SPEC-SCNC: 8.3 MG/DL (ref 8.6–10.5)
CHLORIDE SERPL-SCNC: 104 MMOL/L (ref 98–107)
CO2 SERPL-SCNC: 18.8 MMOL/L (ref 22–29)
CREAT SERPL-MCNC: 0.5 MG/DL (ref 0.57–1)
CREAT UR-MCNC: 44.5 MG/DL
DEPRECATED RDW RBC AUTO: 38 FL (ref 37–54)
EGFRCR SERPLBLD CKD-EPI 2021: 133.7 ML/MIN/1.73
EOSINOPHIL # BLD AUTO: 0.07 10*3/MM3 (ref 0–0.4)
EOSINOPHIL NFR BLD AUTO: 0.8 % (ref 0.3–6.2)
ERYTHROCYTE [DISTWIDTH] IN BLOOD BY AUTOMATED COUNT: 12.6 % (ref 12.3–15.4)
GLOBULIN UR ELPH-MCNC: 3.1 GM/DL
GLUCOSE SERPL-MCNC: 102 MG/DL (ref 65–99)
GLUCOSE UR STRIP-MCNC: NEGATIVE MG/DL
HCT VFR BLD AUTO: 29.5 % (ref 34–46.6)
HGB BLD-MCNC: 9.9 G/DL (ref 12–15.9)
IMM GRANULOCYTES # BLD AUTO: 0.08 10*3/MM3 (ref 0–0.05)
IMM GRANULOCYTES NFR BLD AUTO: 0.9 % (ref 0–0.5)
KETONES UR QL: NEGATIVE
LEUKOCYTE EST, POC: NEGATIVE
LYMPHOCYTES # BLD AUTO: 1.52 10*3/MM3 (ref 0.7–3.1)
LYMPHOCYTES NFR BLD AUTO: 17.9 % (ref 19.6–45.3)
MCH RBC QN AUTO: 28.5 PG (ref 26.6–33)
MCHC RBC AUTO-ENTMCNC: 33.6 G/DL (ref 31.5–35.7)
MCV RBC AUTO: 85 FL (ref 79–97)
MONOCYTES # BLD AUTO: 0.61 10*3/MM3 (ref 0.1–0.9)
MONOCYTES NFR BLD AUTO: 7.2 % (ref 5–12)
NEUTROPHILS NFR BLD AUTO: 6.2 10*3/MM3 (ref 1.7–7)
NEUTROPHILS NFR BLD AUTO: 73 % (ref 42.7–76)
NITRITE UR-MCNC: NEGATIVE MG/ML
NRBC BLD AUTO-RTO: 0 /100 WBC (ref 0–0.2)
PH UR: 7 [PH] (ref 5–8)
PLATELET # BLD AUTO: 244 10*3/MM3 (ref 140–450)
PMV BLD AUTO: 10.9 FL (ref 6–12)
POTASSIUM SERPL-SCNC: 4.2 MMOL/L (ref 3.5–5.2)
PROT ?TM UR-MCNC: 6.8 MG/DL
PROT SERPL-MCNC: 6 G/DL (ref 6–8.5)
PROT UR STRIP-MCNC: NEGATIVE MG/DL
PROT/CREAT UR: 152.8 MG/G CREA (ref 0–200)
RBC # BLD AUTO: 3.47 10*6/MM3 (ref 3.77–5.28)
RBC # UR STRIP: NEGATIVE /UL
SODIUM SERPL-SCNC: 135 MMOL/L (ref 136–145)
SP GR UR: 1.01 (ref 1–1.03)
UROBILINOGEN UR QL: NORMAL
WBC NRBC COR # BLD AUTO: 8.5 10*3/MM3 (ref 3.4–10.8)

## 2024-05-21 PROCEDURE — 80053 COMPREHEN METABOLIC PANEL: CPT | Performed by: OBSTETRICS & GYNECOLOGY

## 2024-05-21 PROCEDURE — 85025 COMPLETE CBC W/AUTO DIFF WBC: CPT | Performed by: OBSTETRICS & GYNECOLOGY

## 2024-05-21 PROCEDURE — 84156 ASSAY OF PROTEIN URINE: CPT | Performed by: OBSTETRICS & GYNECOLOGY

## 2024-05-21 PROCEDURE — 59025 FETAL NON-STRESS TEST: CPT

## 2024-05-21 PROCEDURE — 82570 ASSAY OF URINE CREATININE: CPT | Performed by: OBSTETRICS & GYNECOLOGY

## 2024-05-21 PROCEDURE — G0378 HOSPITAL OBSERVATION PER HR: HCPCS

## 2024-05-21 RX ORDER — SODIUM CHLORIDE 0.9 % (FLUSH) 0.9 %
10 SYRINGE (ML) INJECTION EVERY 12 HOURS SCHEDULED
Status: DISCONTINUED | OUTPATIENT
Start: 2024-05-21 | End: 2024-05-21 | Stop reason: HOSPADM

## 2024-05-21 RX ORDER — LIDOCAINE HYDROCHLORIDE 10 MG/ML
0.5 INJECTION, SOLUTION INFILTRATION; PERINEURAL ONCE AS NEEDED
Status: DISCONTINUED | OUTPATIENT
Start: 2024-05-21 | End: 2024-05-21 | Stop reason: HOSPADM

## 2024-05-21 RX ORDER — SODIUM CHLORIDE 0.9 % (FLUSH) 0.9 %
10 SYRINGE (ML) INJECTION AS NEEDED
Status: DISCONTINUED | OUTPATIENT
Start: 2024-05-21 | End: 2024-05-21 | Stop reason: HOSPADM

## 2024-05-21 RX ORDER — SODIUM CHLORIDE 9 MG/ML
40 INJECTION, SOLUTION INTRAVENOUS AS NEEDED
Status: DISCONTINUED | OUTPATIENT
Start: 2024-05-21 | End: 2024-05-21 | Stop reason: HOSPADM

## 2024-05-21 NOTE — NON STRESS TEST
Yumi Gardner, a  at 35w2d with an MATHEW of 2024, by Ultrasound, was seen at [facility] for a nonstress test.    Chief Complaint   Patient presents with    Elevated Blood Pressure     Pt sent over from office for elevated BP's and visual changes (black spots). Pt denies RUQ pain or epigastric pain. Denies vaginal bleeding or LOF, reports + fetal movement.     Vision Disturbance       Patient Active Problem List   Diagnosis    Rubella non-immune status, antepartum    Maternal varicella, non-immune    Pregnancy    Uterine size date discrepancy pregnancy       Start Time:   Stop Time:     Interpretation A  Nonstress Test Interpretation A: Reactive

## 2024-05-21 NOTE — PROGRESS NOTES
Chief Complaint   Patient presents with    Routine Prenatal Visit     Cc: ob check with growth Us today , reports good FM , c/o hands and feet swelling , denies any headaches or vision changes had one episode of seeing sparkles in her eyes last week        HPI: 23 y.o.  at 35w2d resents for prenatal care    Last OB US growth (since 2024)         Value Time User    EFW%ILE  56%ile 2024  2:06 PM Jyoti Knox MD    AC%ILE  72%ile 2024  2:06 PM Jyoti Knox MD    NIPT  Declined 3/5/2024 11:42 AM Jyoti Knox MD    FETAL SURVEY  NL/Complete 3/5/2024 11:35 AM Jyoti Knox MD          Vitals:    24 1350   BP: 126/85   Weight: 86.2 kg (190 lb)     Total weight gain for pregnancy:  13.6 kg (30 lb)    Review of systems:   Gen: negative  CV:     systemic edema - hands, feet, face   GI: negative  :   negative and good fetal movement noted   MS:    negative  Neuro: visual changes  Pul: negative    Physical Exam  Vitals and nursing note reviewed.   Constitutional:       Appearance: Normal appearance.   Pulmonary:      Effort: Pulmonary effort is normal.   Neurological:      Mental Status: She is alert.   Psychiatric:         Mood and Affect: Mood normal.         Thought Content: Thought content normal.         Judgment: Judgment normal.           A/P  1. Intrauterine pregnancy at 35w2d   2. Pregnancy Risk:  HIGH RISK  Blood pressure is out of parameters.    Diagnoses and all orders for this visit:    1. Encounter for prenatal care in third trimester of first pregnancy (Primary)    2. Screening for blood or protein in urine  -     POC Urinalysis Dipstick    3. Uterine size date discrepancy pregnancy    4. Rubella non-immune status, antepartum    5. Maternal varicella, non-immune        Pre-eclampsia symptoms were discussed and warnings were given.   labor was discussed.  Warnings were provided.  Nutrition and weight gain were  addressed.  -----------------------  PLAN:   Return in about 9 days (around 5/30/2024), or ob check.  Will fetal growth today with an estimated fetal weight at the 56 percentile  Blood pressure increased today-to labor and delivery for evaluation and labs  Preeclampsia warnings given      Jyoti Knox MD  5/21/2024 17:38 EDT

## 2024-05-30 ENCOUNTER — ROUTINE PRENATAL (OUTPATIENT)
Dept: OBSTETRICS AND GYNECOLOGY | Age: 24
End: 2024-05-30
Payer: COMMERCIAL

## 2024-05-30 VITALS — BODY MASS INDEX: 30.99 KG/M2 | DIASTOLIC BLOOD PRESSURE: 80 MMHG | SYSTOLIC BLOOD PRESSURE: 126 MMHG | WEIGHT: 192 LBS

## 2024-05-30 DIAGNOSIS — N89.8 VAGINAL ODOR: ICD-10-CM

## 2024-05-30 DIAGNOSIS — Z36.85 ANTENATAL SCREENING FOR STREPTOCOCCUS B: ICD-10-CM

## 2024-05-30 DIAGNOSIS — Z28.39 RUBELLA NON-IMMUNE STATUS, ANTEPARTUM: ICD-10-CM

## 2024-05-30 DIAGNOSIS — Z13.89 SCREENING FOR BLOOD OR PROTEIN IN URINE: ICD-10-CM

## 2024-05-30 DIAGNOSIS — O09.899 RUBELLA NON-IMMUNE STATUS, ANTEPARTUM: ICD-10-CM

## 2024-05-30 DIAGNOSIS — O09.899 MATERNAL VARICELLA, NON-IMMUNE: ICD-10-CM

## 2024-05-30 DIAGNOSIS — Z3A.36 36 WEEKS GESTATION OF PREGNANCY: Primary | ICD-10-CM

## 2024-05-30 DIAGNOSIS — Z28.39 MATERNAL VARICELLA, NON-IMMUNE: ICD-10-CM

## 2024-05-30 LAB
BILIRUB BLD-MCNC: NEGATIVE MG/DL
CLARITY, POC: CLEAR
COLOR UR: YELLOW
GLUCOSE UR STRIP-MCNC: NEGATIVE MG/DL
KETONES UR QL: NEGATIVE
LEUKOCYTE EST, POC: NEGATIVE
NITRITE UR-MCNC: NEGATIVE MG/ML
PH UR: 7 [PH] (ref 5–8)
PROT UR STRIP-MCNC: NEGATIVE MG/DL
RBC # UR STRIP: NEGATIVE /UL
SP GR UR: 1.02 (ref 1–1.03)
UROBILINOGEN UR QL: NORMAL

## 2024-05-30 NOTE — PROGRESS NOTES
Chief Complaint   Patient presents with    Routine Prenatal Visit     Ob check, 36w4d, gbs today, no complaints       HPI: 23 y.o.  at 36w4d good fm  No lof, vb or contractions  Feels like she has a vaginal odor not fishy just different  Has been checking bp at home 120/80    Relevant data reviewed:      Last OB US growth (since 2024)         Value Time User    EFW%ILE  56%ile 2024  2:06 PM Jyoti Knox MD    AC%ILE  72%ile 2024  2:06 PM Jyoti Knox MD    NIPT  Declined 3/5/2024 11:42 AM Jyoti Knox MD    FETAL SURVEY  NL/Complete 3/5/2024 11:35 AM Jyoti Knox MD          Vitals:    24 1022   BP: 126/80   Weight: 87.1 kg (192 lb)     Total weight gain for pregnancy:  14.5 kg (32 lb)    Review of systems:   Gen: negative  CV:     negative  GI: negative  :   good fetal movement noted   MS:    negative  Neuro: denies headaches and visual changes   Pul: negative    Physical Exam  Constitutional:       General: She is not in acute distress.  Pulmonary:      Effort: Pulmonary effort is normal.   Abdominal:      Palpations: Abdomen is soft.      Tenderness: There is no abdominal tenderness.   Genitourinary:     Vagina: Vaginal discharge present.      Comments: Clear discharge  Skin:     General: Skin is warm.   Neurological:      Mental Status: She is alert.   Psychiatric:         Mood and Affect: Mood normal.         Thought Content: Thought content normal.         Judgment: Judgment normal.         A/P  1. Intrauterine pregnancy at 36w4d   2. Pregnancy Risk:  HIGH RISK    Diagnoses and all orders for this visit:    1. 36 weeks gestation of pregnancy (Primary)    2. Screening for blood or protein in urine  -     POC Urinalysis Dipstick    3. Vaginal odor  -     NuSwab VG+ - Swab, Vagina    4.  screening for streptococcus B  -     Group B Streptococcus Culture - Swab, Vaginal/Rectum    5. Maternal varicella, non-immune    6. Rubella non-immune  status, antepartum        Pre-eclampsia symptoms were discussed and warnings were given.   labor was discussed.  Warnings were provided.  -----------------------  PLAN: nuswab to r/o infection  GBS today  Precautions reviewed  Seeing Dr Knox on 24  No follow-ups on file.    Jyoti Olivia, APRN  2024 12:06 EDT

## 2024-06-03 LAB — B-HEM STREP SPEC QL CULT: NEGATIVE

## 2024-06-04 ENCOUNTER — ROUTINE PRENATAL (OUTPATIENT)
Dept: OBSTETRICS AND GYNECOLOGY | Age: 24
End: 2024-06-04
Payer: COMMERCIAL

## 2024-06-04 VITALS — SYSTOLIC BLOOD PRESSURE: 124 MMHG | DIASTOLIC BLOOD PRESSURE: 86 MMHG | WEIGHT: 195 LBS | BODY MASS INDEX: 31.47 KG/M2

## 2024-06-04 DIAGNOSIS — O09.899 RUBELLA NON-IMMUNE STATUS, ANTEPARTUM: ICD-10-CM

## 2024-06-04 DIAGNOSIS — Z34.03 ENCOUNTER FOR PRENATAL CARE IN THIRD TRIMESTER OF FIRST PREGNANCY: Primary | ICD-10-CM

## 2024-06-04 DIAGNOSIS — O09.899 MATERNAL VARICELLA, NON-IMMUNE: ICD-10-CM

## 2024-06-04 DIAGNOSIS — O26.849 UTERINE SIZE DATE DISCREPANCY PREGNANCY: ICD-10-CM

## 2024-06-04 DIAGNOSIS — Z13.89 SCREENING FOR BLOOD OR PROTEIN IN URINE: ICD-10-CM

## 2024-06-04 DIAGNOSIS — Z28.39 RUBELLA NON-IMMUNE STATUS, ANTEPARTUM: ICD-10-CM

## 2024-06-04 DIAGNOSIS — Z28.39 MATERNAL VARICELLA, NON-IMMUNE: ICD-10-CM

## 2024-06-04 PROCEDURE — 99213 OFFICE O/P EST LOW 20 MIN: CPT | Performed by: OBSTETRICS & GYNECOLOGY

## 2024-06-04 NOTE — PROGRESS NOTES
Chief Complaint   Patient presents with    Routine Prenatal Visit     Cc: ob check , GBS neg , pt denies any VB or fluid loss denies  contractions having some gui chan with back pain , some swelling , declines cervical exam          HPI: 24 y.o.  at 37w2d presents for prenatal care     Last OB US growth (since 2024)         Value Time User    EFW%ILE  56%ile 2024  2:06 PM Jyoti Knox MD    AC%ILE  72%ile 2024  2:06 PM Jyoti Knox MD    NIPT  Declined 3/5/2024 11:42 AM Jyoti Knox MD    FETAL SURVEY  NL/Complete 3/5/2024 11:35 AM Jyoti Knox MD          Vitals:    24 1021   BP: 124/86   Weight: 88.5 kg (195 lb)     Total weight gain for pregnancy:  15.9 kg (35 lb)    Review of systems:   Gen: negative  CV:     negative  GI: negative  :   negative and good fetal movement noted   MS:    negative  Neuro: negative and denies headaches and visual changes   Pul: negative    Physical Exam      A/P  1. Intrauterine pregnancy at 37w2d   2. Pregnancy Risk:  NORMAL  Blood pressure is out of parameters.    Diagnoses and all orders for this visit:    1. Encounter for prenatal care in third trimester of first pregnancy (Primary)    2. Screening for blood or protein in urine  -     POC Urinalysis Dipstick    3. Uterine size date discrepancy pregnancy    4. Rubella non-immune status, antepartum    5. Maternal varicella, non-immune        Pre-eclampsia symptoms were discussed and warnings were given.  Routine labor warnings were discussed and indications for L & D f/u including bleeding, regular contractions, decreased fetal movement or/and rupture of membranes.   Nutrition and weight gain were addressed.  -----------------------  PLAN:   Return in about 1 week (around 2024), or ob check.  Preeclampsia warnings given-patient advised to report to labor and delivery if systolic blood pressure 140 or higher or diastolic blood pressure greater than 90-has blood  pressure cuff at home  Size equal to dates  Labor warnings and fetal kick counts given    Jyoti Knox MD  6/4/2024 10:40 EDT

## 2024-06-10 ENCOUNTER — HOSPITAL ENCOUNTER (INPATIENT)
Facility: HOSPITAL | Age: 24
LOS: 4 days | Discharge: HOME | End: 2024-06-14
Attending: OBSTETRICS & GYNECOLOGY | Admitting: OBSTETRICS & GYNECOLOGY
Payer: COMMERCIAL

## 2024-06-10 ENCOUNTER — ANESTHESIA EVENT (OUTPATIENT)
Dept: EMERGENCY DEPT | Facility: HOSPITAL | Age: 24
End: 2024-06-10
Payer: COMMERCIAL

## 2024-06-10 ENCOUNTER — ANESTHESIA (OUTPATIENT)
Dept: EMERGENCY DEPT | Facility: HOSPITAL | Age: 24
End: 2024-06-10
Payer: COMMERCIAL

## 2024-06-10 LAB
ABO GROUP BLD: NORMAL
ALBUMIN SERPL-MCNC: 3.2 G/DL (ref 3.5–5.2)
ALBUMIN/GLOB SERPL: 1.1 G/DL
ALP SERPL-CCNC: 172 U/L (ref 39–117)
ALT SERPL W P-5'-P-CCNC: 10 U/L (ref 1–33)
ANION GAP SERPL CALCULATED.3IONS-SCNC: 12.3 MMOL/L (ref 5–15)
AST SERPL-CCNC: 12 U/L (ref 1–32)
BASOPHILS # BLD AUTO: 0.05 10*3/MM3 (ref 0–0.2)
BASOPHILS NFR BLD AUTO: 0.5 % (ref 0–1.5)
BILIRUB SERPL-MCNC: <0.2 MG/DL (ref 0–1.2)
BILIRUB UR QL STRIP: NEGATIVE
BLD GP AB SCN SERPL QL: NEGATIVE
BUN SERPL-MCNC: 5 MG/DL (ref 6–20)
BUN/CREAT SERPL: 8.5 (ref 7–25)
CALCIUM SPEC-SCNC: 8.5 MG/DL (ref 8.6–10.5)
CHLORIDE SERPL-SCNC: 104 MMOL/L (ref 98–107)
CLARITY UR: CLEAR
CO2 SERPL-SCNC: 19.7 MMOL/L (ref 22–29)
COLOR UR: YELLOW
CREAT SERPL-MCNC: 0.59 MG/DL (ref 0.57–1)
CREAT UR-MCNC: 69.6 MG/DL
DEPRECATED RDW RBC AUTO: 40.1 FL (ref 37–54)
EGFRCR SERPLBLD CKD-EPI 2021: 127.7 ML/MIN/1.73
EOSINOPHIL # BLD AUTO: 0.09 10*3/MM3 (ref 0–0.4)
EOSINOPHIL NFR BLD AUTO: 0.8 % (ref 0.3–6.2)
ERYTHROCYTE [DISTWIDTH] IN BLOOD BY AUTOMATED COUNT: 13.2 % (ref 12.3–15.4)
GLOBULIN UR ELPH-MCNC: 2.8 GM/DL
GLUCOSE SERPL-MCNC: 109 MG/DL (ref 65–99)
GLUCOSE UR STRIP-MCNC: NEGATIVE MG/DL
HCT VFR BLD AUTO: 30.8 % (ref 34–46.6)
HGB BLD-MCNC: 10.1 G/DL (ref 12–15.9)
HGB UR QL STRIP.AUTO: NEGATIVE
IMM GRANULOCYTES # BLD AUTO: 0.12 10*3/MM3 (ref 0–0.05)
IMM GRANULOCYTES NFR BLD AUTO: 1.1 % (ref 0–0.5)
KETONES UR QL STRIP: NEGATIVE
LEUKOCYTE ESTERASE UR QL STRIP.AUTO: NEGATIVE
LYMPHOCYTES # BLD AUTO: 2.41 10*3/MM3 (ref 0.7–3.1)
LYMPHOCYTES NFR BLD AUTO: 22.2 % (ref 19.6–45.3)
MCH RBC QN AUTO: 27.7 PG (ref 26.6–33)
MCHC RBC AUTO-ENTMCNC: 32.8 G/DL (ref 31.5–35.7)
MCV RBC AUTO: 84.6 FL (ref 79–97)
MONOCYTES # BLD AUTO: 0.82 10*3/MM3 (ref 0.1–0.9)
MONOCYTES NFR BLD AUTO: 7.5 % (ref 5–12)
NEUTROPHILS NFR BLD AUTO: 67.9 % (ref 42.7–76)
NEUTROPHILS NFR BLD AUTO: 7.39 10*3/MM3 (ref 1.7–7)
NITRITE UR QL STRIP: NEGATIVE
NRBC BLD AUTO-RTO: 0 /100 WBC (ref 0–0.2)
PH UR STRIP.AUTO: 7.5 [PH] (ref 5–8)
PLATELET # BLD AUTO: 252 10*3/MM3 (ref 140–450)
PMV BLD AUTO: 11.8 FL (ref 6–12)
POTASSIUM SERPL-SCNC: 3.8 MMOL/L (ref 3.5–5.2)
PROT ?TM UR-MCNC: 7 MG/DL
PROT SERPL-MCNC: 6 G/DL (ref 6–8.5)
PROT UR QL STRIP: NEGATIVE
PROT/CREAT UR: 100.6 MG/G CREA (ref 0–200)
RBC # BLD AUTO: 3.64 10*6/MM3 (ref 3.77–5.28)
RH BLD: POSITIVE
SODIUM SERPL-SCNC: 136 MMOL/L (ref 136–145)
SP GR UR STRIP: 1.01 (ref 1–1.03)
T PALLIDUM IGG SER QL: NORMAL
T&S EXPIRATION DATE: NORMAL
UROBILINOGEN UR QL STRIP: NORMAL
WBC NRBC COR # BLD AUTO: 10.88 10*3/MM3 (ref 3.4–10.8)

## 2024-06-10 PROCEDURE — 86901 BLOOD TYPING SEROLOGIC RH(D): CPT | Performed by: OBSTETRICS & GYNECOLOGY

## 2024-06-10 PROCEDURE — 85025 COMPLETE CBC W/AUTO DIFF WBC: CPT | Performed by: OBSTETRICS & GYNECOLOGY

## 2024-06-10 PROCEDURE — 99202 OFFICE O/P NEW SF 15 MIN: CPT | Performed by: OBSTETRICS & GYNECOLOGY

## 2024-06-10 PROCEDURE — 84156 ASSAY OF PROTEIN URINE: CPT | Performed by: OBSTETRICS & GYNECOLOGY

## 2024-06-10 PROCEDURE — 80053 COMPREHEN METABOLIC PANEL: CPT | Performed by: OBSTETRICS & GYNECOLOGY

## 2024-06-10 PROCEDURE — 81003 URINALYSIS AUTO W/O SCOPE: CPT | Performed by: OBSTETRICS & GYNECOLOGY

## 2024-06-10 PROCEDURE — 86900 BLOOD TYPING SEROLOGIC ABO: CPT | Performed by: OBSTETRICS & GYNECOLOGY

## 2024-06-10 PROCEDURE — 82570 ASSAY OF URINE CREATININE: CPT | Performed by: OBSTETRICS & GYNECOLOGY

## 2024-06-10 PROCEDURE — 86850 RBC ANTIBODY SCREEN: CPT | Performed by: OBSTETRICS & GYNECOLOGY

## 2024-06-10 PROCEDURE — 86780 TREPONEMA PALLIDUM: CPT | Performed by: STUDENT IN AN ORGANIZED HEALTH CARE EDUCATION/TRAINING PROGRAM

## 2024-06-10 RX ORDER — EPHEDRINE SULFATE 50 MG/ML
5 INJECTION, SOLUTION INTRAVENOUS
OUTPATIENT
Start: 2024-06-10

## 2024-06-10 RX ORDER — FAMOTIDINE 10 MG/ML
20 INJECTION, SOLUTION INTRAVENOUS 2 TIMES DAILY PRN
Status: DISCONTINUED | OUTPATIENT
Start: 2024-06-10 | End: 2024-06-12 | Stop reason: HOSPADM

## 2024-06-10 RX ORDER — SODIUM CHLORIDE 0.9 % (FLUSH) 0.9 %
10 SYRINGE (ML) INJECTION AS NEEDED
Status: DISCONTINUED | OUTPATIENT
Start: 2024-06-10 | End: 2024-06-11

## 2024-06-10 RX ORDER — ONDANSETRON 2 MG/ML
4 INJECTION INTRAMUSCULAR; INTRAVENOUS EVERY 6 HOURS PRN
Status: DISCONTINUED | OUTPATIENT
Start: 2024-06-10 | End: 2024-06-12 | Stop reason: HOSPADM

## 2024-06-10 RX ORDER — FENTANYL/ROPIVACAINE/NS/PF 2MCG/ML-.2
10 PLASTIC BAG, INJECTION (ML) INJECTION CONTINUOUS
OUTPATIENT
Start: 2024-06-10 | End: 2024-06-13

## 2024-06-10 RX ORDER — SODIUM CHLORIDE 0.9 % (FLUSH) 0.9 %
10 SYRINGE (ML) INJECTION EVERY 12 HOURS SCHEDULED
Status: DISCONTINUED | OUTPATIENT
Start: 2024-06-10 | End: 2024-06-12 | Stop reason: HOSPADM

## 2024-06-10 RX ORDER — FAMOTIDINE 10 MG/ML
20 INJECTION, SOLUTION INTRAVENOUS ONCE AS NEEDED
OUTPATIENT
Start: 2024-06-10

## 2024-06-10 RX ORDER — ONDANSETRON 4 MG/1
4 TABLET, ORALLY DISINTEGRATING ORAL EVERY 6 HOURS PRN
Status: DISCONTINUED | OUTPATIENT
Start: 2024-06-10 | End: 2024-06-12 | Stop reason: HOSPADM

## 2024-06-10 RX ORDER — SODIUM CHLORIDE 9 MG/ML
40 INJECTION, SOLUTION INTRAVENOUS AS NEEDED
Status: DISCONTINUED | OUTPATIENT
Start: 2024-06-10 | End: 2024-06-12 | Stop reason: HOSPADM

## 2024-06-10 RX ORDER — LIDOCAINE HYDROCHLORIDE 10 MG/ML
0.5 INJECTION, SOLUTION INFILTRATION; PERINEURAL ONCE AS NEEDED
Status: DISCONTINUED | OUTPATIENT
Start: 2024-06-10 | End: 2024-06-12 | Stop reason: HOSPADM

## 2024-06-10 RX ORDER — MORPHINE SULFATE 2 MG/ML
1 INJECTION, SOLUTION INTRAMUSCULAR; INTRAVENOUS EVERY 4 HOURS PRN
Status: DISCONTINUED | OUTPATIENT
Start: 2024-06-10 | End: 2024-06-12 | Stop reason: HOSPADM

## 2024-06-10 RX ORDER — SODIUM CHLORIDE, SODIUM LACTATE, POTASSIUM CHLORIDE, CALCIUM CHLORIDE 600; 310; 30; 20 MG/100ML; MG/100ML; MG/100ML; MG/100ML
125 INJECTION, SOLUTION INTRAVENOUS CONTINUOUS
Status: DISCONTINUED | OUTPATIENT
Start: 2024-06-10 | End: 2024-06-12

## 2024-06-10 RX ORDER — FAMOTIDINE 20 MG/1
20 TABLET, FILM COATED ORAL 2 TIMES DAILY PRN
Status: DISCONTINUED | OUTPATIENT
Start: 2024-06-10 | End: 2024-06-12 | Stop reason: HOSPADM

## 2024-06-10 RX ORDER — SODIUM CHLORIDE 9 MG/ML
40 INJECTION, SOLUTION INTRAVENOUS AS NEEDED
Status: DISCONTINUED | OUTPATIENT
Start: 2024-06-10 | End: 2024-06-11

## 2024-06-10 RX ORDER — ONDANSETRON 2 MG/ML
4 INJECTION INTRAMUSCULAR; INTRAVENOUS ONCE AS NEEDED
OUTPATIENT
Start: 2024-06-10

## 2024-06-10 RX ORDER — NALOXONE HCL 0.4 MG/ML
0.4 VIAL (ML) INJECTION
Status: DISCONTINUED | OUTPATIENT
Start: 2024-06-10 | End: 2024-06-12 | Stop reason: HOSPADM

## 2024-06-10 RX ORDER — MAGNESIUM CARB/ALUMINUM HYDROX 105-160MG
30 TABLET,CHEWABLE ORAL ONCE AS NEEDED
Status: COMPLETED | OUTPATIENT
Start: 2024-06-10 | End: 2024-06-11

## 2024-06-10 RX ORDER — DIPHENHYDRAMINE HYDROCHLORIDE 50 MG/ML
12.5 INJECTION INTRAMUSCULAR; INTRAVENOUS EVERY 8 HOURS PRN
OUTPATIENT
Start: 2024-06-10

## 2024-06-10 RX ORDER — TERBUTALINE SULFATE 1 MG/ML
0.25 INJECTION, SOLUTION SUBCUTANEOUS AS NEEDED
Status: DISCONTINUED | OUTPATIENT
Start: 2024-06-10 | End: 2024-06-12 | Stop reason: HOSPADM

## 2024-06-10 RX ORDER — SODIUM CHLORIDE 0.9 % (FLUSH) 0.9 %
10 SYRINGE (ML) INJECTION EVERY 12 HOURS SCHEDULED
Status: DISCONTINUED | OUTPATIENT
Start: 2024-06-10 | End: 2024-06-11

## 2024-06-10 RX ORDER — SODIUM CHLORIDE 0.9 % (FLUSH) 0.9 %
10 SYRINGE (ML) INJECTION AS NEEDED
Status: DISCONTINUED | OUTPATIENT
Start: 2024-06-10 | End: 2024-06-12 | Stop reason: HOSPADM

## 2024-06-10 RX ORDER — ACETAMINOPHEN 325 MG/1
650 TABLET ORAL EVERY 4 HOURS PRN
Status: DISCONTINUED | OUTPATIENT
Start: 2024-06-10 | End: 2024-06-12 | Stop reason: HOSPADM

## 2024-06-11 PROBLEM — O13.3 GESTATIONAL HYPERTENSION, THIRD TRIMESTER: Status: ACTIVE | Noted: 2024-06-11

## 2024-06-11 PROCEDURE — 3E0P7VZ INTRODUCTION OF HORMONE INTO FEMALE REPRODUCTIVE, VIA NATURAL OR ARTIFICIAL OPENING: ICD-10-PCS | Performed by: STUDENT IN AN ORGANIZED HEALTH CARE EDUCATION/TRAINING PROGRAM

## 2024-06-11 PROCEDURE — 25010000002 MORPHINE PER 10 MG: Performed by: STUDENT IN AN ORGANIZED HEALTH CARE EDUCATION/TRAINING PROGRAM

## 2024-06-11 PROCEDURE — 3E033VJ INTRODUCTION OF OTHER HORMONE INTO PERIPHERAL VEIN, PERCUTANEOUS APPROACH: ICD-10-PCS | Performed by: STUDENT IN AN ORGANIZED HEALTH CARE EDUCATION/TRAINING PROGRAM

## 2024-06-11 PROCEDURE — 25810000003 LACTATED RINGERS PER 1000 ML: Performed by: STUDENT IN AN ORGANIZED HEALTH CARE EDUCATION/TRAINING PROGRAM

## 2024-06-11 RX ORDER — OXYTOCIN/0.9 % SODIUM CHLORIDE 30/500 ML
2-20 PLASTIC BAG, INJECTION (ML) INTRAVENOUS
Status: DISCONTINUED | OUTPATIENT
Start: 2024-06-11 | End: 2024-06-12

## 2024-06-11 RX ADMIN — MINERAL OIL 10 ML: 1000 SOLUTION ORAL at 15:45

## 2024-06-11 RX ADMIN — MORPHINE SULFATE 1 MG: 2 INJECTION, SOLUTION INTRAMUSCULAR; INTRAVENOUS at 07:10

## 2024-06-11 RX ADMIN — SODIUM CHLORIDE, POTASSIUM CHLORIDE, SODIUM LACTATE AND CALCIUM CHLORIDE 125 ML/HR: 600; 310; 30; 20 INJECTION, SOLUTION INTRAVENOUS at 16:15

## 2024-06-11 RX ADMIN — DINOPROSTONE 10 MG: 10 INSERT VAGINAL at 01:32

## 2024-06-11 RX ADMIN — Medication 2 MILLI-UNITS/MIN: at 16:21

## 2024-06-11 RX ADMIN — SODIUM CHLORIDE, POTASSIUM CHLORIDE, SODIUM LACTATE AND CALCIUM CHLORIDE 125 ML/HR: 600; 310; 30; 20 INJECTION, SOLUTION INTRAVENOUS at 23:54

## 2024-06-11 RX ADMIN — ACETAMINOPHEN 325MG 650 MG: 325 TABLET ORAL at 21:11

## 2024-06-11 NOTE — OBED NOTES
"[facility] [place] [facility] [place] Name: Yumi Gardner  YOB: 2000  MRN: [id number]  Admission Date: 6/10/2024  8:23 PM  Date of Service: 6/10/2024    Chief Complaint: Hypertension (Arrived to [facility] with complaints of elevated blood pressures at home, 180/116 initially and then was 143/108 about 1 hour later.  Baby active, denies rupture is membranes, denies vaginal bleeding.  Had noticed \"floaters\" around 330pm none currently,  3+ reflexes, 2+ pitting edema,  no clonus, denies headache, or epigastic pain.  Lungs clear.)        Subjective     Yumi Gardner is a 24 y.o. female  at 38w1d with Estimated Date of Delivery: 24 who presents with the chief complaint listed above.  She sees Sondra Swenson MD for her prenatal care. Her pregnancy has been complicated by:   anemia, rubella and varicella non-immune, recent hypertension .    Patient reports borderline blood pressure for past couple of weeks and a few episode of mild range elevations in readings.  She is here tonight for severe range BP at home and having floaters.  The floaters have since subsided.  She does not have headache, RUQ/epigastric pain, or shortness of air.  .    She describes fetal movement as normal.  She denies rupture of membranes.  She denies vaginal bleeding. She is not feeling contractions.          Objective   Patient Active Problem List    Diagnosis     Uterine size date discrepancy pregnancy [O26.849]     Pregnancy [Z34.90]     Rubella non-immune status, antepartum [O09.899, Z28.39]     Maternal varicella, non-immune [O09.899, Z28.39]         OB History    Para Term  AB Living   1 0 0 0 0 0   SAB IAB Ectopic Molar Multiple Live Births   0 0 0 0 0 0      # Outcome Date GA Lbr Devendra/2nd Weight Sex Type Anes PTL Lv   1 Current               Obstetric Comments   23 - IUP at 8-4 weeks by US - MATHEW 24 - [facility] -    24 - 20-2 weeks - Normal but Incomplete anatomy - CL - 3.3 cm - [facility]    3/5/24 " - 24-2 weeks - EFW 47%, AC 56 % - Normal completed anatomy -[facility]    5/21/24 - 35-2 weeks EFW 56%, AC 72% - [facility]         Past Medical History:   Diagnosis Date    Anxiety 2016    Healthy adult on routine physical examination     Pregnancy 2/6/2024       Past Surgical History:   Procedure Laterality Date    NO PAST SURGERIES      TONSILLECTOMY      WISDOM TOOTH EXTRACTION Bilateral 2020       No current facility-administered medications on file prior to encounter.     Current Outpatient Medications on File Prior to Encounter   Medication Sig Dispense Refill    Prenatal Vit-Fe Sulfate-FA-DHA (Prenatal Vitamin/Min +DHA) 27-0.8-200 MG capsule Take 1 tablet by mouth Daily. 30 capsule 11       Allergies   Allergen Reactions    Adhesive Tape Hives       Family History   Problem Relation Age of Onset    No Known Problems Father     No Known Problems Mother     Hearing loss Cousin     Hearing loss Cousin     Breast cancer Neg Hx     Ovarian cancer Neg Hx     Uterine cancer Neg Hx     Colon cancer Neg Hx        Social History     Socioeconomic History    Marital status: Single    Number of children: 0   Tobacco Use    Smoking status: Never    Smokeless tobacco: Never   Vaping Use    Vaping status: Never Used   Substance and Sexual Activity    Alcohol use: No    Drug use: No    Sexual activity: Yes     Partners: Male     Birth control/protection: None           Review of Systems   Constitutional:  Negative for chills, fatigue and fever.   HENT:  Negative for congestion, rhinorrhea and sore throat.    Eyes:  Positive for visual disturbance.   Respiratory: Negative.     Cardiovascular: Negative.    Gastrointestinal:  Negative for abdominal pain, constipation, diarrhea, nausea and vomiting.   Genitourinary:  Negative for difficulty urinating, dyspareunia, dysuria, flank pain, frequency, genital sores, hematuria, pelvic pain, urgency, vaginal bleeding, vaginal discharge and vaginal pain.   Neurological:  Negative for dizziness,  seizures, light-headedness and headaches.   Psychiatric/Behavioral:  Negative for sleep disturbance. The patient is not nervous/anxious.           PHYSICAL EXAM:      VITAL SIGNS:  Vitals:    06/10/24 2115 06/10/24 2130 06/10/24 2145 06/10/24 2200   BP: 146/92 140/92 (!) 157/104 163/100   BP Location: Right arm Right arm  Right arm   Patient Position: Lying Sitting  Sitting   Pulse: 83 83 102 95   Resp: 18      Temp:       TempSrc:       Weight:       Height:            FHT'S:                   Baseline:  135 BPM  Variability:  Moderate = 6 - 25 BPM  Accelerations:  15 x 15 accelerations present     Decelerations:  absent  Contractions:   absent     Interpretation:    Reactive NST, CAT 1 tracing        PHYSICAL EXAM:    General: well developed; well nourished  no acute distress   Heart: Not performed.   Lungs  : breathing is unlabored     Abdomen: soft, non-tender; no masses  no umbilical or inguinal hernias are present  no hepato-splenomegaly       Cervix: was not checked.      Contractions: none        Extremities: peripheral pulses normal, no pedal edema, no clubbing or cyanosis      LABS AND TESTING ORDERED:  Uterine and fetal monitoring  Urinalysis  CBC, CMP, urine p/c    LAB RESULTS:    Recent Results (from the past 24 hour(s))   Comprehensive Metabolic Panel    Collection Time: 06/10/24  9:12 PM    Specimen: Arm, Right; Blood   Result Value Ref Range    Glucose 109 (H) 65 - 99 mg/dL    BUN 5 (L) 6 - 20 mg/dL    Creatinine 0.59 0.57 - 1.00 mg/dL    Sodium 136 136 - 145 mmol/L    Potassium 3.8 3.5 - 5.2 mmol/L    Chloride 104 98 - 107 mmol/L    CO2 19.7 (L) 22.0 - 29.0 mmol/L    Calcium 8.5 (L) 8.6 - 10.5 mg/dL    Total Protein 6.0 6.0 - 8.5 g/dL    Albumin 3.2 (L) 3.5 - 5.2 g/dL    ALT (SGPT) 10 1 - 33 U/L    AST (SGOT) 12 1 - 32 U/L    Alkaline Phosphatase 172 (H) 39 - 117 U/L    Total Bilirubin <0.2 0.0 - 1.2 mg/dL    Globulin 2.8 gm/dL    A/G Ratio 1.1 g/dL    BUN/Creatinine Ratio 8.5 7.0 - 25.0    Anion  Gap 12.3 5.0 - 15.0 mmol/L    eGFR 127.7 >60.0 mL/min/1.73   Protein / Creatinine Ratio, Urine - Urine, Clean Catch    Collection Time: 06/10/24  9:12 PM    Specimen: Urine, Clean Catch   Result Value Ref Range    Protein/Creatinine Ratio, Urine 100.6 0.0 - 200.0 mg/G Crea    Creatinine, Urine 69.6 mg/dL    Total Protein, Urine 7.0 mg/dL   Urinalysis With Microscopic If Indicated (No Culture) - Urine, Clean Catch    Collection Time: 06/10/24  9:12 PM    Specimen: Urine, Clean Catch   Result Value Ref Range    Color, UA Yellow Yellow, Straw    Appearance, UA Clear Clear    pH, UA 7.5 5.0 - 8.0    Specific Gravity, UA 1.012 1.005 - 1.030    Glucose, UA Negative Negative    Ketones, UA Negative Negative    Bilirubin, UA Negative Negative    Blood, UA Negative Negative    Protein, UA Negative Negative    Leuk Esterase, UA Negative Negative    Nitrite, UA Negative Negative    Urobilinogen, UA 1.0 E.U./dL 0.2 - 1.0 E.U./dL   CBC Auto Differential    Collection Time: 06/10/24  9:12 PM    Specimen: Arm, Right; Blood   Result Value Ref Range    WBC 10.88 (H) 3.40 - 10.80 10*3/mm3    RBC 3.64 (L) 3.77 - 5.28 10*6/mm3    Hemoglobin 10.1 (L) 12.0 - 15.9 g/dL    Hematocrit 30.8 (L) 34.0 - 46.6 %    MCV 84.6 79.0 - 97.0 fL    MCH 27.7 26.6 - 33.0 pg    MCHC 32.8 31.5 - 35.7 g/dL    RDW 13.2 12.3 - 15.4 %    RDW-SD 40.1 37.0 - 54.0 fl    MPV 11.8 6.0 - 12.0 fL    Platelets 252 140 - 450 10*3/mm3    Neutrophil % 67.9 42.7 - 76.0 %    Lymphocyte % 22.2 19.6 - 45.3 %    Monocyte % 7.5 5.0 - 12.0 %    Eosinophil % 0.8 0.3 - 6.2 %    Basophil % 0.5 0.0 - 1.5 %    Immature Grans % 1.1 (H) 0.0 - 0.5 %    Neutrophils, Absolute 7.39 (H) 1.70 - 7.00 10*3/mm3    Lymphocytes, Absolute 2.41 0.70 - 3.10 10*3/mm3    Monocytes, Absolute 0.82 0.10 - 0.90 10*3/mm3    Eosinophils, Absolute 0.09 0.00 - 0.40 10*3/mm3    Basophils, Absolute 0.05 0.00 - 0.20 10*3/mm3    Immature Grans, Absolute 0.12 (H) 0.00 - 0.05 10*3/mm3    nRBC 0.0 0.0 - 0.2 /100  WBC       Lab Results   Component Value Date    ABO O 2023    RH Positive 2023       Lab Results   Component Value Date    STREPGPB Negative 2024                 Assessment & Plan     ASSESSMENT/PLAN:  Yumi Gardner is a 24 y.o. female  at 38w1d who presented with: hypertension and seeing floaters.  Floaters were transient and have resolved.  Patient has persistent elevation in blood pressure here.  Her labs do not show evidence of HELLP syndrome or pre-eclampsia.  Due to persistent elevation in blood pressure and patient being 38 wga, induction was recommended for gestational hypertension.  Risks of maternal and fetal morbidity with continuing pregnancy were discussed (seizure, stillbirth, etc) with patient and her .   After discussion, they were amenable to admission and induction of labor.           Final Impression:  Pregnancy at 38w1d  Reactive NST.  CAT 1 tracing  Maternal vital signs were reviewed and were  as noted above             Vitals:    06/10/24 2115 06/10/24 2130 06/10/24 2145 06/10/24 2200   BP: 146/92 140/92 (!) 157/104 163/100   BP Location: Right arm Right arm  Right arm   Patient Position: Lying Sitting  Sitting   Pulse: 83 83 102 95   Resp: 18      Temp:       TempSrc:       Weight:       Height:           Lab Results   Component Value Date    STREPGPB Negative 2024     Lab Results   Component Value Date    ABO O 2023    RH Positive 2023     COVID - 19 status unknown    PLAN:       I have spent 30 minutes including face to face time with the patient, greater than 50% in discussion of the diagnosis (counseling) and/or coordination of care.     Sondra Swenson MD  6/10/2024  22:03 EDT  OB Hospitalist  Phone:  [phone]

## 2024-06-11 NOTE — PLAN OF CARE
Problem: Adult Inpatient Plan of Care  Goal: Plan of Care Review  Outcome: Ongoing, Progressing  Flowsheets (Taken 6/11/2024 0618)  Outcome Evaluation: Pt was taking BP at home and was elevated and noticed seeing black spots. Came through [facility] and was having mild range pressures. Pt is a cervidil induction. Cervidil was placed at 0132. Cervical exam was 0/20/-3. OK to continue plan of care.  Goal: Patient-Specific Goal (Individualized)  Outcome: Ongoing, Progressing  Goal: Absence of Hospital-Acquired Illness or Injury  Outcome: Ongoing, Progressing  Intervention: Identify and Manage Fall Risk  Recent Flowsheet Documentation  Taken 6/10/2024 2300 by Tawana Park RN  Safety Promotion/Fall Prevention: safety round/check completed  Intervention: Prevent and Manage VTE (Venous Thromboembolism) Risk  Recent Flowsheet Documentation  Taken 6/10/2024 2300 by Tawana Park RN  Activity Management: up ad jocelin  Goal: Optimal Comfort and Wellbeing  Outcome: Ongoing, Progressing  Intervention: Provide Person-Centered Care  Recent Flowsheet Documentation  Taken 6/10/2024 2300 by Tawana Park RN  Trust Relationship/Rapport:   care explained   choices provided   emotional support provided   empathic listening provided   questions answered   questions encouraged   reassurance provided   thoughts/feelings acknowledged  Goal: Readiness for Transition of Care  Outcome: Ongoing, Progressing  Intervention: Mutually Develop Transition Plan  Recent Flowsheet Documentation  Taken 6/10/2024 2259 by Tawana Park RN  Equipment Currently Used at Home: none  Taken 6/10/2024 2258 by Tawana Park RN  Equipment Currently Used at Home: none  Taken 6/10/2024 2257 by Tawana Park RN  Equipment Needed After Discharge: none  Equipment Currently Used at Home: none  Anticipated Changes Related to Illness: none  Transportation Anticipated: family or friend will provide  Transportation Concerns: no car  Concerns to be Addressed: no  discharge needs identified  Readmission Within the Last 30 Days: no previous admission in last 30 days  Patient/Family Anticipated Services at Transition: none  Patient/Family Anticipates Transition to: home     Problem: Hypertensive Disorders in Pregnancy  Goal: Maternal-Fetal Stabilization  Outcome: Ongoing, Progressing     Problem: Bleeding (Labor)  Goal: Hemostasis  Outcome: Ongoing, Progressing     Problem: Change in Fetal Wellbeing (Labor)  Goal: Stable Fetal Wellbeing  Outcome: Ongoing, Progressing     Problem: Delayed Labor Progression (Labor)  Goal: Effective Progression to Delivery  Outcome: Ongoing, Progressing     Problem: Infection (Labor)  Goal: Absence of Infection Signs and Symptoms  Outcome: Ongoing, Progressing     Problem: Labor Pain (Labor)  Goal: Acceptable Pain Control  Outcome: Ongoing, Progressing     Problem: Uterine Tachysystole (Labor)  Goal: Normal Uterine Contraction Pattern  Outcome: Ongoing, Progressing   Goal Outcome Evaluation:              Outcome Evaluation: Pt was taking BP at home and was elevated and noticed seeing black spots. Came through [facility] and was having mild range pressures. Pt is a cervidil induction. Cervidil was placed at 0132. Cervical exam was 0/20/-3. OK to continue plan of care.

## 2024-06-11 NOTE — PLAN OF CARE
Problem: Adult Inpatient Plan of Care  Goal: Plan of Care Review  Outcome: Ongoing, Progressing  Flowsheets (Taken 6/11/2024 1734)  Plan of Care Reviewed With:   patient   spouse  Outcome Evaluation:   IOL for GHTN, cervidil x12hrs, with minimal cervical change, started pitocin at 1620 after shower and lunch   Cat 1 fetal strip most of the day, minimal cramping/contractions discomfort plus back aching, coping well  Goal: Patient-Specific Goal (Individualized)  Outcome: Ongoing, Progressing  Goal: Absence of Hospital-Acquired Illness or Injury  Outcome: Ongoing, Progressing  Intervention: Identify and Manage Fall Risk  Recent Flowsheet Documentation  Taken 6/11/2024 1615 by Marcia Anglin RN  Safety Promotion/Fall Prevention: safety round/check completed  Intervention: Prevent Skin Injury  Recent Flowsheet Documentation  Taken 6/11/2024 1615 by Marcia Anglin RN  Body Position: position changed independently  Intervention: Prevent and Manage VTE (Venous Thromboembolism) Risk  Recent Flowsheet Documentation  Taken 6/11/2024 1615 by Marcia Anglin RN  Activity Management: (encouraged to walk around room, utilize birth ball, etc)   up ad jocelin   activity encouraged  Goal: Optimal Comfort and Wellbeing  Outcome: Ongoing, Progressing  Intervention: Provide Person-Centered Care  Recent Flowsheet Documentation  Taken 6/11/2024 1615 by Marcia Anglin RN  Trust Relationship/Rapport:   care explained   choices provided   emotional support provided   empathic listening provided   questions encouraged   questions answered   reassurance provided   thoughts/feelings acknowledged  Goal: Readiness for Transition of Care  Outcome: Ongoing, Progressing   Goal Outcome Evaluation:  Plan of Care Reviewed With: patient, spouse           Outcome Evaluation: IOL for GHTN, cervidil x12hrs, with minimal cervical change, started pitocin at 1620 after shower and lunch; Cat 1 fetal strip most of the day, minimal cramping/contractions  discomfort plus back aching, coping well

## 2024-06-11 NOTE — PROGRESS NOTES
"INTRAPARTUM EVALUATION    Subjective:    Pain is controlled.  Patient is uncomfortable with Cervidil and has had 1 dose of morphine.  Cervidil comes out about 1:30 PM today.  Plan to start Pitocin after that.  Her blood pressures have been good.  No headache    Objective:    /85   Pulse 88   Temp 98 °F (36.7 °C) (Oral)   Resp 16   Ht 167.6 cm (66\")   Wt 89.8 kg (198 lb)   LMP 08/24/2023 (Approximate)   BMI 31.96 kg/m²     Cervix is 0cm/20%/-2    FHR tracing description: Reassuring category 1    [place]: Irritability present    GBS status: negative    Assessment and Plan:    Patient Active Problem List   Diagnosis    Rubella non-immune status, antepartum    Maternal varicella, non-immune    Pregnancy    Uterine size date discrepancy pregnancy    Gestational hypertension, third trimester       Labor Plan: Remove Cervidil at 130 and start Pitocin thereafter    Fetal Plan: Fetal status is reassuring    GBS treatment: Not applicable    Other plan: Blood pressure has been stable to borderline range no severe values.  Patient induced due to gestational hypertension.  No signs of preeclampsia  "

## 2024-06-11 NOTE — H&P
[facility]   Obstetrics and Gynecology   History & Physical    2024    Patient: Yumi Gardner          MR#:[id number]    Chief complaint:  gestational hypertension    Subjective     24 y.o. female  at 38w2d presents with new onset elevated blood pressures.  She reports seeing black spots this afternoon and checking BP.  BP was severe range so she came to delivery.  She has had multiple mild range BP and one unsustained severe range BP.  She is not taking any antihypertensives.    Pregnancy is complicated by rubella and varicella nonimmune status.      Patient Active Problem List   Diagnosis    Rubella non-immune status, antepartum    Maternal varicella, non-immune    Pregnancy    Uterine size date discrepancy pregnancy    Gestational hypertension, third trimester       Past Medical History:   Diagnosis Date    Anxiety 2016    Healthy adult on routine physical examination     Pregnancy 2024       Past Surgical History:   Procedure Laterality Date    NO PAST SURGERIES      TONSILLECTOMY      WISDOM TOOTH EXTRACTION Bilateral        Obstetric History:  OB History          1    Para   0    Term   0       0    AB   0    Living   0         SAB   0    IAB   0    Ectopic   0    Molar   0    Multiple   0    Live Births   0          Obstetric Comments   23 - IUP at 8-4 weeks by US - MATHEW 24 - [facility] -   24 - 20-2 weeks - Normal but Incomplete anatomy - CL - 3.3 cm - [facility]   3/5/24 - 24-2 weeks - EFW 47%, AC 56 % - Normal completed anatomy -F   24 - 35-2 weeks EFW 56%, AC 72% - [facility]               Menstrual History:     Patient's last menstrual period was 2023 (approximate).       # 1 - Date: None, Sex: None, Weight: None, GA: None, Type: None, Apgar1: None, Apgar5: None, Living: None, Birth Comments: None      Prenatal Information:  Prenatal Results       Initial Prenatal Labs       Test Value Reference Range Date Time    Hemoglobin  12.9 g/dL 11.1 - 15.9  11/16/23 1514    Hematocrit  38.5 % 34.0 - 46.6 11/16/23 1514    Platelets  314 x10E3/uL 150 - 450 11/16/23 1514    Rubella IgG  <0.90 index Immune >0.99 11/16/23 1514    Hepatitis B SAg  Negative  Negative 11/16/23 1514    Hepatitis C Ab  Non Reactive  Non Reactive 11/16/23 1514    RPR  Non Reactive  Non Reactive 04/01/24 1030       Non Reactive  Non Reactive 11/16/23 1514    T. Pallidum Ab   Non-Reactive  Non-Reactive 06/10/24 2232    ABO  O   06/10/24 2232    Rh  Positive   06/10/24 2232    Antibody Screen  Negative  Negative 11/16/23 1514    HIV  Non Reactive  Non Reactive 11/16/23 1514    Urine Culture  Final report   11/16/23 1545    Gonorrhea  Negative  Negative 05/30/24 1410       Negative  Negative 01/11/24 1141       Negative  Negative 11/16/23 1613       Negative  Negative 10/17/23 1513    Chlamydia  Negative  Negative 05/30/24 1410       Negative  Negative 01/11/24 1141       Negative  Negative 11/16/23 1613       Negative  Negative 10/17/23 1513    TSH        HgB A1c   5.3 % 4.8 - 5.6 11/16/23 1514    Varicella IgG  <135 index Immune >165 11/16/23 1514    HgB Electrophoresis         Hemoglobinopathy (genetic testing)        Cystic fibrosis                   Fetal testing        Test Value Reference Range Date Time    NIPT        MSAFP        AFP-4                  2nd and 3rd Trimester       Test Value Reference Range Date Time    Hemoglobin (repeated)  10.1 g/dL 12.0 - 15.9 06/10/24 2112       9.9 g/dL 12.0 - 15.9 05/21/24 1549       11.1 g/dL 11.1 - 15.9 04/01/24 1030    Hematocrit (repeated)  30.8 % 34.0 - 46.6 06/10/24 2112       29.5 % 34.0 - 46.6 05/21/24 1549       34.1 % 34.0 - 46.6 04/01/24 1030    Platelets   252 10*3/mm3 140 - 450 06/10/24 2112       244 10*3/mm3 140 - 450 05/21/24 1549       260 x10E3/uL 150 - 450 04/01/24 1030       314 x10E3/uL 150 - 450 11/16/23 1514    1 hour GTT   118 mg/dL 70 - 139 04/01/24 1030    Antibody Screen (repeated)  Negative   06/10/24 2232    3rd TM syphilis  scrn (repeated)  RPR   Non Reactive  Non Reactive 04/01/24 1030    3rd TM syphilis scrn (repeated) FTA        GTT Fasting        GTT 1 Hr        GTT 2 Hr        GTT 3 Hr        Group B Strep  Negative  Negative 05/30/24 1451              Other testing        Test Value Reference Range Date Time    Parvo IgG         CMV IgG                   Drug Screening       Test Value Reference Range Date Time    Amphetamine Screen        Barbiturate Screen        Benzodiazepine Screen        Methadone Screen        Phencyclidine Screen        Opiates Screen        THC Screen        Cocaine Screen        Propoxyphene Screen        Buprenorphine Screen        Methamphetamine Screen        Oxycodone Screen        Tricyclic Antidepressants Screen                  Legend    ^: Historical                          External Prenatal Results       Pregnancy Outside Results - Transcribed From Office Records - See Scanned Records For Details       Test Value Date Time    ABO  O  06/10/24 2232    Rh  Positive  06/10/24 2232    Antibody Screen  Negative  06/10/24 2232       Negative  11/16/23 1514    Varicella IgG  <135 index 11/16/23 1514    Rubella  <0.90 index 11/16/23 1514    Hgb  10.1 g/dL 06/10/24 2112       9.9 g/dL 05/21/24 1549       11.1 g/dL 04/01/24 1030       12.9 g/dL 11/16/23 1514    Hct  30.8 % 06/10/24 2112       29.5 % 05/21/24 1549       34.1 % 04/01/24 1030       38.5 % 11/16/23 1514    HgB A1c   5.3 % 11/16/23 1514    1h GTT  118 mg/dL 04/01/24 1030    3h GTT Fasting       3h GTT 1 hour       3h GTT 2 hour       3h GTT 3 hour        Gonorrhea (discrete)  Negative  05/30/24 1410       Negative  01/11/24 1141       Negative  11/16/23 1613       Negative  10/17/23 1513    Chlamydia (discrete)  Negative  05/30/24 1410       Negative  01/11/24 1141       Negative  11/16/23 1613       Negative  10/17/23 1513    RPR  Non Reactive  04/01/24 1030       Non Reactive  11/16/23 1514    Syphilis Antibody       HBsAg  Negative   11/16/23 1514    Herpes Simplex Virus PCR       Herpes Simplex VIrus Culture       HIV  Non Reactive  11/16/23 1514    Hep C RNA Quant PCR       Hep C Antibody  Non Reactive  11/16/23 1514    AFP       NIPT       Cystic Fibroisis        Group B Strep  Negative  05/30/24 1451    GBS Susceptibility to Clindamycin       GBS Susceptibility to Erythromycin       Fetal Fibronectin       Genetic Testing, Maternal Blood                 Drug Screening       Test Value Date Time    Urine Drug Screen       Amphetamine Screen       Barbiturate Screen       Benzodiazepine Screen       Methadone Screen       Phencyclidine Screen       Opiates Screen       THC Screen       Cocaine Screen       Propoxyphene Screen       Buprenorphine Screen       Methamphetamine Screen       Oxycodone Screen       Tricyclic Antidepressants Screen                 Legend    ^: Historical                              Family History   Problem Relation Age of Onset    No Known Problems Father     No Known Problems Mother     Hearing loss Cousin     Hearing loss Cousin     Breast cancer Neg Hx     Ovarian cancer Neg Hx     Uterine cancer Neg Hx     Colon cancer Neg Hx        Social History     Tobacco Use    Smoking status: Never    Smokeless tobacco: Never   Vaping Use    Vaping status: Never Used   Substance Use Topics    Alcohol use: No    Drug use: No       Adhesive tape      Current Facility-Administered Medications:     acetaminophen (TYLENOL) tablet 650 mg, 650 mg, Oral, Q4H PRN, Edelmira Gant MD    famotidine (PEPCID) injection 20 mg, 20 mg, Intravenous, BID PRN **OR** famotidine (PEPCID) tablet 20 mg, 20 mg, Oral, BID PRN, Edelmira Gant MD    lactated ringers infusion, 125 mL/hr, Intravenous, Continuous, Edelmira Gant MD    lidocaine (XYLOCAINE) 1 % injection 0.5 mL, 0.5 mL, Intradermal, Once PRN, Edelmira Gant MD    mineral oil liquid 30 mL, 30 mL, Topical, Once PRN, Edelmira Gant MD    morphine injection  1 mg, 1 mg, Intravenous, Q4H PRN **AND** naloxone (NARCAN) injection 0.4 mg, 0.4 mg, Intravenous, Q5 Min PRN, Edelmira Gant MD    ondansetron ODT (ZOFRAN-ODT) disintegrating tablet 4 mg, 4 mg, Oral, Q6H PRN **OR** ondansetron (ZOFRAN) injection 4 mg, 4 mg, Intravenous, Q6H PRN, Edelmira Gant MD    sodium chloride 0.9 % flush 10 mL, 10 mL, Intravenous, Q12H, Sondra Swenson MD    sodium chloride 0.9 % flush 10 mL, 10 mL, Intravenous, PRN, Sondra Swenson MD    sodium chloride 0.9 % flush 10 mL, 10 mL, Intravenous, Q12H, Edelmira Gant MD    sodium chloride 0.9 % flush 10 mL, 10 mL, Intravenous, PRN, Edelmira Gant MD    sodium chloride 0.9 % infusion 40 mL, 40 mL, Intravenous, PRN, Sondra Swenson MD    sodium chloride 0.9 % infusion 40 mL, 40 mL, Intravenous, PRAlfreda HERMAN Avital O'Brien, MD    terbutaline (BRETHINE) injection 0.25 mg, 0.25 mg, Subcutaneous, PRN, Edelmira Gant MD    Review of Systems  Review of Systems   All other systems reviewed and are negative.      Objective     Vital Signs  Temp:  [97.8 °F (36.6 °C)-98.5 °F (36.9 °C)] 98.5 °F (36.9 °C)  Heart Rate:  [] 87  Resp:  [18] 18  BP: (138-163)/() 138/88    Physical Exam:  Physical Exam  Vitals and nursing note reviewed.   Constitutional:       General: She is not in acute distress.     Appearance: Normal appearance.   HENT:      Head: Normocephalic and atraumatic.   Eyes:      Extraocular Movements: Extraocular movements intact.   Cardiovascular:      Rate and Rhythm: Normal rate.   Pulmonary:      Effort: Pulmonary effort is normal. No respiratory distress.   Abdominal:      General: There is no distension.      Palpations: Abdomen is soft. There is no mass.      Tenderness: There is no abdominal tenderness.   Musculoskeletal:         General: Normal range of motion.      Cervical back: Normal range of motion.   Skin:     General: Skin is warm and dry.   Neurological:      General: No  focal deficit present.      Mental Status: She is alert and oriented to person, place, and time.   Psychiatric:         Mood and Affect: Mood normal.         Behavior: Behavior normal.           Hospital problem list:    Pregnancy    Rubella non-immune status, antepartum    Maternal varicella, non-immune    Uterine size date discrepancy pregnancy    Gestational hypertension, third trimester      Assessment & Plan     1. Intrauterine pregnancy at 38w2d presents with gestational hypertension  -Recommend proceeding with induction of labor.  Patient amenable.  -Reviewed procedure with patient.  I discussed the risks including but not limited to bleeding, infection and damage to internal organs.  Understanding of the procedure is voiced.   -GBS neg  -Cervadil ordered    Dispo: admit for delivery    Edelmira Gant MD  06/11/24  01:05 EDT      Patient Care Team:  Travon Hughes MD as PCP - General (Family Medicine)

## 2024-06-12 PROCEDURE — 25810000003 LACTATED RINGERS PER 1000 ML: Performed by: STUDENT IN AN ORGANIZED HEALTH CARE EDUCATION/TRAINING PROGRAM

## 2024-06-12 PROCEDURE — 59410 OBSTETRICAL CARE: CPT | Performed by: OBSTETRICS & GYNECOLOGY

## 2024-06-12 PROCEDURE — C1755 CATHETER, INTRASPINAL: HCPCS | Performed by: ANESTHESIOLOGY

## 2024-06-12 PROCEDURE — 0KQM0ZZ REPAIR PERINEUM MUSCLE, OPEN APPROACH: ICD-10-PCS | Performed by: OBSTETRICS & GYNECOLOGY

## 2024-06-12 PROCEDURE — 25010000002 MORPHINE PER 10 MG: Performed by: STUDENT IN AN ORGANIZED HEALTH CARE EDUCATION/TRAINING PROGRAM

## 2024-06-12 RX ORDER — SODIUM CHLORIDE 0.9 % (FLUSH) 0.9 %
1-10 SYRINGE (ML) INJECTION AS NEEDED
Status: DISCONTINUED | OUTPATIENT
Start: 2024-06-12 | End: 2024-06-14 | Stop reason: HOSPADM

## 2024-06-12 RX ORDER — CARBOPROST TROMETHAMINE 250 UG/ML
250 INJECTION, SOLUTION INTRAMUSCULAR
Status: DISCONTINUED | OUTPATIENT
Start: 2024-06-12 | End: 2024-06-12 | Stop reason: HOSPADM

## 2024-06-12 RX ORDER — IBUPROFEN 600 MG/1
600 TABLET ORAL EVERY 6 HOURS PRN
Status: DISCONTINUED | OUTPATIENT
Start: 2024-06-12 | End: 2024-06-14 | Stop reason: HOSPADM

## 2024-06-12 RX ORDER — MINERAL OIL
OIL (ML) MISCELLANEOUS AS NEEDED
Status: DISCONTINUED | OUTPATIENT
Start: 2024-06-12 | End: 2024-06-12

## 2024-06-12 RX ORDER — DOCUSATE SODIUM 100 MG/1
100 CAPSULE, LIQUID FILLED ORAL 2 TIMES DAILY
Status: DISCONTINUED | OUTPATIENT
Start: 2024-06-12 | End: 2024-06-14 | Stop reason: HOSPADM

## 2024-06-12 RX ORDER — METHYLERGONOVINE MALEATE 0.2 MG/ML
200 INJECTION INTRAVENOUS ONCE AS NEEDED
Status: DISCONTINUED | OUTPATIENT
Start: 2024-06-12 | End: 2024-06-12 | Stop reason: HOSPADM

## 2024-06-12 RX ORDER — OXYTOCIN/0.9 % SODIUM CHLORIDE 30/500 ML
125 PLASTIC BAG, INJECTION (ML) INTRAVENOUS ONCE AS NEEDED
Status: DISCONTINUED | OUTPATIENT
Start: 2024-06-12 | End: 2024-06-14 | Stop reason: HOSPADM

## 2024-06-12 RX ORDER — OXYTOCIN/0.9 % SODIUM CHLORIDE 30/500 ML
999 PLASTIC BAG, INJECTION (ML) INTRAVENOUS ONCE
Status: DISCONTINUED | OUTPATIENT
Start: 2024-06-12 | End: 2024-06-12 | Stop reason: HOSPADM

## 2024-06-12 RX ORDER — PRENATAL VIT/IRON FUM/FOLIC AC 27MG-0.8MG
1 TABLET ORAL DAILY
Status: DISCONTINUED | OUTPATIENT
Start: 2024-06-12 | End: 2024-06-14 | Stop reason: HOSPADM

## 2024-06-12 RX ORDER — OXYTOCIN/0.9 % SODIUM CHLORIDE 30/500 ML
250 PLASTIC BAG, INJECTION (ML) INTRAVENOUS CONTINUOUS
Status: ACTIVE | OUTPATIENT
Start: 2024-06-12 | End: 2024-06-12

## 2024-06-12 RX ORDER — DIPHENHYDRAMINE HYDROCHLORIDE 50 MG/ML
12.5 INJECTION INTRAMUSCULAR; INTRAVENOUS EVERY 8 HOURS PRN
Status: DISCONTINUED | OUTPATIENT
Start: 2024-06-12 | End: 2024-06-12 | Stop reason: HOSPADM

## 2024-06-12 RX ORDER — PROMETHAZINE HYDROCHLORIDE 12.5 MG/1
12.5 TABLET ORAL EVERY 4 HOURS PRN
Status: DISCONTINUED | OUTPATIENT
Start: 2024-06-12 | End: 2024-06-14 | Stop reason: HOSPADM

## 2024-06-12 RX ORDER — HYDROCODONE BITARTRATE AND ACETAMINOPHEN 5; 325 MG/1; MG/1
1 TABLET ORAL EVERY 4 HOURS PRN
Status: DISCONTINUED | OUTPATIENT
Start: 2024-06-12 | End: 2024-06-14 | Stop reason: HOSPADM

## 2024-06-12 RX ORDER — HYDROCODONE BITARTRATE AND ACETAMINOPHEN 10; 325 MG/1; MG/1
1 TABLET ORAL EVERY 4 HOURS PRN
Status: DISCONTINUED | OUTPATIENT
Start: 2024-06-12 | End: 2024-06-14 | Stop reason: HOSPADM

## 2024-06-12 RX ORDER — MISOPROSTOL 200 UG/1
800 TABLET ORAL ONCE AS NEEDED
Status: COMPLETED | OUTPATIENT
Start: 2024-06-12 | End: 2024-06-12

## 2024-06-12 RX ORDER — LIDOCAINE HYDROCHLORIDE AND EPINEPHRINE 15; 5 MG/ML; UG/ML
INJECTION, SOLUTION EPIDURAL AS NEEDED
Status: DISCONTINUED | OUTPATIENT
Start: 2024-06-12 | End: 2024-06-12 | Stop reason: SURG

## 2024-06-12 RX ORDER — ACETAMINOPHEN 325 MG/1
650 TABLET ORAL EVERY 6 HOURS PRN
Status: DISCONTINUED | OUTPATIENT
Start: 2024-06-12 | End: 2024-06-14 | Stop reason: HOSPADM

## 2024-06-12 RX ORDER — FAMOTIDINE 10 MG/ML
20 INJECTION, SOLUTION INTRAVENOUS ONCE AS NEEDED
Status: DISCONTINUED | OUTPATIENT
Start: 2024-06-12 | End: 2024-06-12 | Stop reason: HOSPADM

## 2024-06-12 RX ORDER — ONDANSETRON 4 MG/1
4 TABLET, ORALLY DISINTEGRATING ORAL EVERY 8 HOURS PRN
Status: DISCONTINUED | OUTPATIENT
Start: 2024-06-12 | End: 2024-06-14 | Stop reason: HOSPADM

## 2024-06-12 RX ORDER — FENTANYL/ROPIVACAINE/NS/PF 2MCG/ML-.2
10 PLASTIC BAG, INJECTION (ML) INJECTION CONTINUOUS
Status: DISCONTINUED | OUTPATIENT
Start: 2024-06-12 | End: 2024-06-12

## 2024-06-12 RX ORDER — ONDANSETRON 2 MG/ML
4 INJECTION INTRAMUSCULAR; INTRAVENOUS ONCE AS NEEDED
Status: DISCONTINUED | OUTPATIENT
Start: 2024-06-12 | End: 2024-06-12 | Stop reason: HOSPADM

## 2024-06-12 RX ORDER — HYDROCORTISONE 25 MG/G
1 CREAM TOPICAL AS NEEDED
Status: DISCONTINUED | OUTPATIENT
Start: 2024-06-12 | End: 2024-06-14 | Stop reason: HOSPADM

## 2024-06-12 RX ORDER — ONDANSETRON 2 MG/ML
4 INJECTION INTRAMUSCULAR; INTRAVENOUS EVERY 6 HOURS PRN
Status: DISCONTINUED | OUTPATIENT
Start: 2024-06-12 | End: 2024-06-14 | Stop reason: HOSPADM

## 2024-06-12 RX ORDER — BISACODYL 10 MG
10 SUPPOSITORY, RECTAL RECTAL DAILY PRN
Status: DISCONTINUED | OUTPATIENT
Start: 2024-06-13 | End: 2024-06-14 | Stop reason: HOSPADM

## 2024-06-12 RX ORDER — EPHEDRINE SULFATE 50 MG/ML
5 INJECTION, SOLUTION INTRAVENOUS
Status: DISCONTINUED | OUTPATIENT
Start: 2024-06-12 | End: 2024-06-12 | Stop reason: HOSPADM

## 2024-06-12 RX ADMIN — Medication 10 ML/HR: at 10:45

## 2024-06-12 RX ADMIN — MORPHINE SULFATE 1 MG: 2 INJECTION, SOLUTION INTRAMUSCULAR; INTRAVENOUS at 02:23

## 2024-06-12 RX ADMIN — Medication 10 ML/HR: at 03:32

## 2024-06-12 RX ADMIN — IBUPROFEN 600 MG: 600 TABLET, FILM COATED ORAL at 17:03

## 2024-06-12 RX ADMIN — IBUPROFEN 600 MG: 600 TABLET, FILM COATED ORAL at 23:28

## 2024-06-12 RX ADMIN — SODIUM CHLORIDE, POTASSIUM CHLORIDE, SODIUM LACTATE AND CALCIUM CHLORIDE 125 ML/HR: 600; 310; 30; 20 INJECTION, SOLUTION INTRAVENOUS at 08:05

## 2024-06-12 RX ADMIN — Medication 250 ML/HR: at 12:57

## 2024-06-12 RX ADMIN — LIDOCAINE HYDROCHLORIDE AND EPINEPHRINE 3 ML: 15; 5 INJECTION, SOLUTION EPIDURAL at 03:28

## 2024-06-12 RX ADMIN — Medication: at 17:04

## 2024-06-12 RX ADMIN — MISOPROSTOL 800 MCG: 200 TABLET ORAL at 12:46

## 2024-06-12 RX ADMIN — Medication: at 11:56

## 2024-06-12 RX ADMIN — Medication 2 MILLI-UNITS/MIN: at 05:26

## 2024-06-12 NOTE — ANESTHESIA PREPROCEDURE EVALUATION
" Anesthesia Evaluation     Patient summary reviewed and Nursing notes reviewed   NPO Solid Status: > 8 hours  NPO Liquid Status: > 2 hours           Airway   Mallampati: III  TM distance: >3 FB  Neck ROM: full  Possible difficult intubation  Dental - normal exam     Pulmonary - normal exam   Cardiovascular - normal exam    (+) hypertension      Neuro/Psych  (+) psychiatric history Anxiety  GI/Hepatic/Renal/Endo      Musculoskeletal     Abdominal   (+) obese   Substance History      OB/GYN    (+) Pregnant        Other        ROS/Med Hx Other: 38w1d                Anesthesia Plan    ASA 2     epidural     (I have reviewed the patient's history with the patient and the chart, including all pertinent laboratory results and imaging. I have explained the risks of epidural anesthesia including but not limited to hypotension, PDPH, nerve injury, and risk of failure/replacement. Patient understands risks and agrees to proceed.      /96   Pulse 79   Temp 36.9 °C (98.4 °F) (Oral)   Resp 16   Ht 167.6 cm (66\")   Wt 89.8 kg (198 lb)   LMP 08/24/2023 (Approximate)   BMI 31.96 kg/m²   )    Anesthetic plan, risks, benefits, and alternatives have been provided, discussed and informed consent has been obtained with: patient.    CODE STATUS:    Level Of Support Discussed With: Patient  Code Status (Patient has no pulse and is not breathing): CPR (Attempt to Resuscitate)  Medical Interventions (Patient has pulse or is breathing): Full Support      "

## 2024-06-12 NOTE — PLAN OF CARE
Problem: Adult Inpatient Plan of Care  Goal: Plan of Care Review  Outcome: Ongoing, Progressing  Flowsheets  Taken 6/12/2024 1418 by Clary Rodriguez, RN  Plan of Care Reviewed With:   patient   spouse   family  Outcome Evaluation: Patient progressed to complete, pushed for 1 hour and delivered a viable healthy female infant. Patient bleeding and pain controlled postpartum.  Taken 6/12/2024 0536 by Faith Ashley RN  Progress: improving   Goal Outcome Evaluation:  Plan of Care Reviewed With: patient, spouse, family           Outcome Evaluation: Patient progressed to complete, pushed for 1 hour and delivered a viable healthy female infant. Patient bleeding and pain controlled postpartum.

## 2024-06-12 NOTE — PLAN OF CARE
Problem: Adult Inpatient Plan of Care  Goal: Plan of Care Review  Outcome: Ongoing, Progressing  Flowsheets  Taken 6/12/2024 0536 by Faith Ashley RN  Progress: improving  Outcome Evaluation: Patient IOL for GHTN. Pitocin infusing and AROM for induction. Epidural in place. Pain currently controlled.  Taken 6/11/2024 1734 by Marcia Anglin RN  Plan of Care Reviewed With:   patient   spouse  Goal: Patient-Specific Goal (Individualized)  Outcome: Ongoing, Progressing  Goal: Absence of Hospital-Acquired Illness or Injury  Outcome: Ongoing, Progressing  Intervention: Identify and Manage Fall Risk  Recent Flowsheet Documentation  Taken 6/11/2024 1915 by Faith Ashley RN  Safety Promotion/Fall Prevention: safety round/check completed  Intervention: Prevent Skin Injury  Recent Flowsheet Documentation  Taken 6/11/2024 1915 by Faith Ashley RN  Skin Protection: adhesive use limited  Intervention: Prevent and Manage VTE (Venous Thromboembolism) Risk  Recent Flowsheet Documentation  Taken 6/11/2024 1915 by Faith Ashley RN  Activity Management:   up ad jocelin   ambulated in room  Range of Motion: active ROM (range of motion) encouraged  Goal: Optimal Comfort and Wellbeing  Outcome: Ongoing, Progressing  Intervention: Monitor Pain and Promote Comfort  Recent Flowsheet Documentation  Taken 6/12/2024 0000 by Faith Ashley RN  Pain Management Interventions:   heat applied   pillow support provided  Taken 6/11/2024 2300 by Faith Ashley RN  Pain Management Interventions: (Pt reports increase in pain. Refuses SVE, heat pack applied and position adjusted)   heat applied   position adjusted   pillow support provided   other (see comments)  Intervention: Provide Person-Centered Care  Recent Flowsheet Documentation  Taken 6/12/2024 0000 by Faith Ashley RN  Trust Relationship/Rapport:   care explained   choices provided   emotional support provided   empathic listening provided   questions answered    questions encouraged   reassurance provided   thoughts/feelings acknowledged  Taken 6/11/2024 2111 by Faith Ashley RN  Trust Relationship/Rapport:   care explained   choices provided   emotional support provided   empathic listening provided   questions answered   questions encouraged   reassurance provided   thoughts/feelings acknowledged  Taken 6/11/2024 1915 by Faith Ashley RN  Trust Relationship/Rapport:   care explained   choices provided   emotional support provided   empathic listening provided   questions answered   questions encouraged   reassurance provided   thoughts/feelings acknowledged  Goal: Readiness for Transition of Care  Outcome: Ongoing, Progressing     Problem: Hypertensive Disorders in Pregnancy  Goal: Maternal-Fetal Stabilization  Outcome: Ongoing, Progressing  Intervention: Optimize Blood Pressure and Fluid Status  Recent Flowsheet Documentation  Taken 6/11/2024 1915 by Faith Ashley RN  Fetal Wellbeing Promotion:   fetal heart rate monitored   intake and output monitored     Problem: Bleeding (Labor)  Goal: Hemostasis  Outcome: Ongoing, Progressing     Problem: Change in Fetal Wellbeing (Labor)  Goal: Stable Fetal Wellbeing  Outcome: Ongoing, Progressing  Intervention: Promote and Monitor Fetal Wellbeing  Recent Flowsheet Documentation  Taken 6/11/2024 1915 by Faith Ashley RN  Fetal Wellbeing Promotion:   fetal heart rate monitored   intake and output monitored     Problem: Delayed Labor Progression (Labor)  Goal: Effective Progression to Delivery  Outcome: Ongoing, Progressing     Problem: Infection (Labor)  Goal: Absence of Infection Signs and Symptoms  Outcome: Ongoing, Progressing     Problem: Labor Pain (Labor)  Goal: Acceptable Pain Control  Outcome: Ongoing, Progressing  Intervention: Support Labor Pain Coping and Management  Recent Flowsheet Documentation  Taken 6/12/2024 0223 by Faith Ashley RN  Pain Management Interventions: (see mar) --     Problem:  Uterine Tachysystole (Labor)  Goal: Normal Uterine Contraction Pattern  Outcome: Ongoing, Progressing     Problem:  Fall Injury Risk  Goal: Absence of Fall, Infant Drop and Related Injury  Outcome: Ongoing, Progressing  Intervention: Promote Injury-Free Environment  Recent Flowsheet Documentation  Taken 2024 by Faith Ashley, RN  Safety Promotion/Fall Prevention: safety round/check completed   Goal Outcome Evaluation:           Progress: improving  Outcome Evaluation: Patient IOL for GHTN. Pitocin infusing and AROM for induction. Epidural in place. Pain currently controlled.

## 2024-06-12 NOTE — ANESTHESIA POSTPROCEDURE EVALUATION
Patient: Yumi Gardner    Procedure Summary       Date: 06/12/24 Room / Location:     Anesthesia Start: 0316 Anesthesia Stop: 1238    Procedure: LABOR ANALGESIA Diagnosis:     Scheduled Providers:  Provider: Daija Lockwood MD    Anesthesia Type: epidural ASA Status: 2            Anesthesia Type: epidural    Vitals  Vitals Value Taken Time   /83 06/12/24 1300   Temp 36.9 °C (98.4 °F) 06/12/24 1214   Pulse 118 06/12/24 1300   Resp 18 06/12/24 1214   SpO2 99 % 06/12/24 1214   Vitals shown include unfiled device data.        Post Anesthesia Care and Evaluation    Anesthetic complications: No anesthetic complications

## 2024-06-12 NOTE — LACTATION NOTE
This note was copied from a baby's chart.  P1, T baby-new admission. Informed PT that LC is available to help with BF until 2100. Mom reports baby was not able to BF in L&D and she will need help. She is getting up to the bathroom for a first time and said will call after that. Mom already ordered PBP. She denies any questions. Encouraged to call if needing help.

## 2024-06-12 NOTE — PROGRESS NOTES
"INTRAPARTUM EVALUATION    Subjective:    Pain is controlled with epidural.  AROM at 2 am with clear fluid. Pt received epidural thereafter .  She had a prolonged variable deceleration and pitocin turned off.  Cervix changed to 3-4 cm.   She is currently comfortable and pitocin resumed.     Objective:    /90   Pulse 89   Temp 98.4 °F (36.9 °C)   Resp 16   Ht 167.6 cm (66\")   Wt 89.8 kg (198 lb)   LMP 08/24/2023 (Approximate)   SpO2 99%   BMI 31.96 kg/m²     Cervix is 4cm/80%/-2    FHR tracing description: category 1 reassuring    [place]: contractions q 5 min    GBS status: Negative    Assessment and Plan:    Patient Active Problem List   Diagnosis    Rubella non-immune status, antepartum    Maternal varicella, non-immune    Pregnancy    Uterine size date discrepancy pregnancy    Gestational hypertension, third trimester       Labor Plan: continue pitocin.      Fetal Plan: reassuring tracing    GBS treatment: no     Other plan:     Blood pressures have been in normal to borderline range  Continue to increase Pitocin for a adequate labor pattern.  Anticipate vaginal delivery  "

## 2024-06-12 NOTE — PROGRESS NOTES
Patient resting comfortably with epidural.  She received Cervidil on admission and then was switched to Pitocin.  She did have a prolonged deceleration overnight but fetal tracing is currently reassuring.  Patient now 5 to 6 cm.  Intrauterine pressure catheter placed without difficulty.

## 2024-06-12 NOTE — ANESTHESIA PROCEDURE NOTES
Labor Epidural      Patient reassessed immediately prior to procedure    Patient location during procedure: OB  Performed By  Anesthesiologist: Daija Lockwood MD  Preanesthetic Checklist  Completed: patient identified, IV checked, site marked, risks and benefits discussed, surgical consent, monitors and equipment checked, pre-op evaluation and timeout performed  Prep:  Pt Position:sitting  Sterile Tech:cap, gloves, gown, mask and sterile barrier  Prep:chlorhexidine gluconate and isopropyl alcohol  Monitoring:blood pressure monitoring, continuous pulse oximetry and EKG  Epidural Block Procedure:  Approach:midline  Guidance:landmark technique and palpation technique  Location:L3-L4  Needle Type:Tuohy  Needle Gauge:17 G  Loss of Resistance Medium: saline  Loss of Resistance: 6cm  Cath Depth at skin:10 cm  Paresthesia: none  Aspiration:negative  Test Dose:negative  Number of Attempts: 1  Post Assessment:  Dressing:occlusive dressing applied and secured with tape  Pt Tolerance:patient tolerated the procedure well with no apparent complications  Complications:no

## 2024-06-12 NOTE — LACTATION NOTE
Lactation Consult Note     PT's RN asked LC to help mom with BF. Reports she is not able to latch baby and is frustrated. Infant's mouth assessed and very high palate noticed.  Assisted mother with waking up baby and attempting to latch her on the right breast in a cross cradle/laid back position, without success. Mom's breasts are very dense, her right nipples are short and retracting. Infant is not able to grasp the nipple and a part of the areola and constantly coming off , getting frustrated and has to be re latch.  Finally after working for 10  minutes with mom and baby -NS 20 mm given with instructions of placement and use. Baby was able to latch easily. She is latching well and has a good jaw rotation. Educated mom on different ways to keep baby awake during BF.  Encouraged her to BF baby every 2-3 hours for 15 min on each breast. Educated her on the importance of stimulation for adequate milk supply. Educated her also on the importance of deep latch, hand expression, how to know if infant is getting enough and burping baby between breasts. Mother is able to express colostrum. PT denies any questions.Encouraged to call if needing further help.       Evaluation Completed: 2024 19:56 EDT  Patient Name: Yumi Gardner  :  2000  MRN:  [id number]     REFERRAL  INFORMATION:                          Date of Referral: 24   Person Making Referral: patient  Maternal Reason for Referral: latch difficulty  Infant Reason for Referral: sleepy (high palate)    DELIVERY HISTORY:        Skin to skin initiation date/time: 2024  12:40 PM   Skin to skin end date/time: 2024  1:43 PM        MATERNAL ASSESSMENT:  Breast Size Issue: none (24)  Breast Shape: Bilateral:, round (24)  Breast Density: Bilateral:, dense (24)  Areola: Bilateral:, dense (24)  Nipples: Bilateral:, short, retracting (24)                INFANT ASSESSMENT:  Information for  the patient's :  Shaji Gardner [[id number]]   No past medical history on file.   Feeding Readiness Cues: sleeping (24)                     Feeding Interventions: arousal required, jaw supported, latch assistance provided, lips stroked, sucking promoted (24)               Breastfeeding: breastfeeding, right side only (24)   Infant Positioning: cross-cradle, cradle (24)         Effective Latch During Feeding: yes (with NS) (24)   Suck/Swallow/Breathing Coordination: present (24)   Signs of Milk Transfer: deep jaw excursions noted (24)       Latch: 1-->repeated attempts, holds nipple in mouth, stimulate to suck (24)   Audible Swallowin-->a few with stimulation (24)   Type of Nipple: 1-->flat (24)   Comfort (Breast/Nipple): 2-->soft/nontender (24)   Hold (Positioning): 0-->full assist (staff holds infant at breast) (24)   Latch Score: 5 (24)                    MATERNAL INFANT FEEDING:     Maternal Emotional State: receptive, relaxed (24)  Infant Positioning: cross-cradle, cradle (24)   Signs of Milk Transfer: deep jaw excursions noted (24)  Pain with Feeding: no (24)           Milk Ejection Reflex: present (24)           Latch Assistance: full assistance needed (24)                               EQUIPMENT TYPE:                                 BREAST PUMPING:          LACTATION REFERRALS:

## 2024-06-12 NOTE — NURSING NOTE
RN at bedside pushing with patient from 1145 to delivery at 1238. Assessing fetal heart tones every 5-15 minutes.

## 2024-06-12 NOTE — L&D DELIVERY NOTE
[facility]   Vaginal Delivery Note    Patient Name: Yumi Gardner  : 2000  MRN: [id number]    Date of Delivery: 2024     Diagnosis     Pre & Post-Delivery:  Intrauterine pregnancy at 38w3d  Labor status: Induced Onset of Labor     Pregnancy    Rubella non-immune status, antepartum    Maternal varicella, non-immune    Uterine size date discrepancy pregnancy    Gestational hypertension, third trimester    Vaginal delivery             Problem List    Transfer to Postpartum     Review the Delivery Report for details.     Delivery     Delivery: Vaginal, Spontaneous     YOB: 2026    Time of Birth:  Gestational Age 12:38 PM   38w3d     Anesthesia: Epidural     Delivering clinician:     Forceps?   No   Vacuum? No    Shoulder dystocia present: No        Delivery narrative: Patient was admitted for induction of labor secondary to gestational hypertension.  She was given Cervidil and had a good response.  She was then started on Pitocin.  Amniotomy was performed that returned clear fluid.  She progressed to complete and was set up to push.  She pushed for approximately 50 minutes to deliver a live viable female infant in direct occiput anterior position that restituted to maternal right.  Loose nuchal cord was reduced after delivery.  Anterior and posterior shoulders delivered without difficulty.  Fundal massage and IV Pitocin were used for hemostasis.  Mild uterine atony noted so 800 mcg Cytotec placed per rectum.  Uterus manually explored and no membranes returned.  Bladder was drained and second-degree laceration repaired.      Infant     Findings: female  infant     Infant observations: Weight: No birth weight on file.   Length:   in  Observations/Comments:  rajendra lr 3      Apgars: 8  @ 1 minute /    9  @ 5 minutes   Infant Name: Inna      Placenta & Cord         Placenta delivered  Spontaneous  at   2024 12:41 PM     Cord: 2 vessels  present.   Nuchal Cord?  yes; Number of  nuchal loops present:  1    Cord blood obtained: Yes    Cord gases obtained:  No      Repair     Episiotomy: None     No    Lacerations: Yes  Laceration Information  Laceration Repaired?   Perineal: 2nd  Yes    Periurethral:       Labial:       Sulcus:       Vaginal:       Cervical:         Suture used for repair: 3-0 Vicryl  Laceration Length for 3rd or 4th degree lacerations: N/A   Estimated Blood Loss:       Quantitative Blood Loss: Quantitative Blood Loss (mL): 557 mL (06/12/24 1257)        Complications     Hypertension, uterine atony    Disposition     Mother to Remain in LD  in stable condition currently.  Baby to remains with mom  in stable condition currently.    Jyoti Knox MD  06/12/24  13:04 EDT

## 2024-06-13 LAB
BASOPHILS # BLD AUTO: 0.04 10*3/MM3 (ref 0–0.2)
BASOPHILS NFR BLD AUTO: 0.3 % (ref 0–1.5)
DEPRECATED RDW RBC AUTO: 41.4 FL (ref 37–54)
EOSINOPHIL # BLD AUTO: 0.06 10*3/MM3 (ref 0–0.4)
EOSINOPHIL NFR BLD AUTO: 0.4 % (ref 0.3–6.2)
ERYTHROCYTE [DISTWIDTH] IN BLOOD BY AUTOMATED COUNT: 13.3 % (ref 12.3–15.4)
HCT VFR BLD AUTO: 25.5 % (ref 34–46.6)
HGB BLD-MCNC: 8.1 G/DL (ref 12–15.9)
IMM GRANULOCYTES # BLD AUTO: 0.1 10*3/MM3 (ref 0–0.05)
IMM GRANULOCYTES NFR BLD AUTO: 0.7 % (ref 0–0.5)
LYMPHOCYTES # BLD AUTO: 1.83 10*3/MM3 (ref 0.7–3.1)
LYMPHOCYTES NFR BLD AUTO: 13.6 % (ref 19.6–45.3)
MCH RBC QN AUTO: 27.1 PG (ref 26.6–33)
MCHC RBC AUTO-ENTMCNC: 31.8 G/DL (ref 31.5–35.7)
MCV RBC AUTO: 85.3 FL (ref 79–97)
MONOCYTES # BLD AUTO: 1.11 10*3/MM3 (ref 0.1–0.9)
MONOCYTES NFR BLD AUTO: 8.2 % (ref 5–12)
NEUTROPHILS NFR BLD AUTO: 10.36 10*3/MM3 (ref 1.7–7)
NEUTROPHILS NFR BLD AUTO: 76.8 % (ref 42.7–76)
NRBC BLD AUTO-RTO: 0 /100 WBC (ref 0–0.2)
PLATELET # BLD AUTO: 193 10*3/MM3 (ref 140–450)
PMV BLD AUTO: 11.6 FL (ref 6–12)
RBC # BLD AUTO: 2.99 10*6/MM3 (ref 3.77–5.28)
WBC NRBC COR # BLD AUTO: 13.5 10*3/MM3 (ref 3.4–10.8)

## 2024-06-13 PROCEDURE — 85025 COMPLETE CBC W/AUTO DIFF WBC: CPT | Performed by: OBSTETRICS & GYNECOLOGY

## 2024-06-13 RX ORDER — TRANEXAMIC ACID 10 MG/ML
1000 INJECTION, SOLUTION INTRAVENOUS ONCE AS NEEDED
Status: DISCONTINUED | OUTPATIENT
Start: 2024-06-13 | End: 2024-06-14 | Stop reason: HOSPADM

## 2024-06-13 RX ADMIN — DOCUSATE SODIUM 100 MG: 100 CAPSULE, LIQUID FILLED ORAL at 08:19

## 2024-06-13 RX ADMIN — HYDROCODONE BITARTRATE AND ACETAMINOPHEN 1 TABLET: 5; 325 TABLET ORAL at 04:59

## 2024-06-13 RX ADMIN — IBUPROFEN 600 MG: 600 TABLET, FILM COATED ORAL at 23:49

## 2024-06-13 RX ADMIN — HYDROCODONE BITARTRATE AND ACETAMINOPHEN 1 TABLET: 5; 325 TABLET ORAL at 14:26

## 2024-06-13 RX ADMIN — DOCUSATE SODIUM 100 MG: 100 CAPSULE, LIQUID FILLED ORAL at 20:23

## 2024-06-13 RX ADMIN — IBUPROFEN 600 MG: 600 TABLET, FILM COATED ORAL at 08:19

## 2024-06-13 RX ADMIN — ACETAMINOPHEN 325MG 650 MG: 325 TABLET ORAL at 17:27

## 2024-06-13 RX ADMIN — Medication 1 TABLET: at 08:19

## 2024-06-13 RX ADMIN — HYDROCODONE BITARTRATE AND ACETAMINOPHEN 1 TABLET: 5; 325 TABLET ORAL at 09:15

## 2024-06-13 RX ADMIN — IBUPROFEN 600 MG: 600 TABLET, FILM COATED ORAL at 14:26

## 2024-06-13 NOTE — LACTATION NOTE
This note was copied from a baby's chart.  Informed PT, LC is available again today to help with BF until 2100. Mom reports baby is BF  so much better. She asked for hep with her PBP.  Cimilre pump's settings reviewed with the PT. She denies any questions. Encouraged to call if needing BF help

## 2024-06-13 NOTE — PROGRESS NOTES
"                     Postpartum  Day#1    Subjective:    Chief Complaint   Patient presents with    Hypertension     Arrived to [facility] with complaints of elevated blood pressures at home, 180/116 initially and then was 143/108 about 1 hour later.  Baby active, denies rupture is membranes, denies vaginal bleeding.  Had noticed \"floaters\" around 330pm none currently,  3+ reflexes, 2+ pitting edema,  no clonus, denies headache, or epigastic pain.  Lungs clear.     Pt doing well. Pain well controlled. Lochia normal. Ambulating well. Tolerating regular diet. Voiding without difficulty. No complaints    OB History    Para Term  AB Living   1 1 1 0 0 1   SAB IAB Ectopic Molar Multiple Live Births   0 0 0 0 0 1      # Outcome Date GA Lbr Devendra/2nd Weight Sex Type Anes PTL Lv   1 Term 24 38w3d 34:00 / 01:06 2950 g (6 lb 8.1 oz) F Vag-Spont EPI N ZENAIDA      Birth Comments: panda lr 3      Obstetric Comments   23 - IUP at 8-4 weeks by US - [facility] 24 - F -    24 - 20-2 weeks - Normal but Incomplete anatomy - CL - 3.3 cm - [facility]    3/5/24 - 24-2 weeks - EFW 47%, AC 56 % - Normal completed anatomy -[facility]    24 - 35-2 weeks EFW 56%, AC 72% - F         Vitals:   Wt Readings from Last 3 Encounters:   06/10/24 89.8 kg (198 lb)   24 88.5 kg (195 lb)   24 87.1 kg (192 lb)     Temp Readings from Last 3 Encounters:   24 98 °F (36.7 °C) (Oral)   24 98.9 °F (37.2 °C) (Oral)   24 97.5 °F (36.4 °C) (Oral)     BP Readings from Last 3 Encounters:   24 115/73   24 124/86   24 126/80     Pulse Readings from Last 3 Encounters:   24 87   24 88   24 96   ROS:      ROS:Pulm: neg for soa  GI: neg for heavy bleeding              Musculoskel: neg for leg pain        LABS:  Lab Results (last 24 hours)       Procedure Component Value Units Date/Time    CBC & Differential [[id number]]  (Abnormal) Collected: 24 0554    Specimen: Blood Updated: 24 " 622    Narrative:      The following orders were created for panel order CBC & Differential.  Procedure                               Abnormality         Status                     ---------                               -----------         ------                     CBC Auto Differential[[id number]]        Abnormal            Final result                 Please view results for these tests on the individual orders.    CBC Auto Differential [[id number]]  (Abnormal) Collected: 24    Specimen: Blood Updated: 24     WBC 13.50 10*3/mm3      RBC 2.99 10*6/mm3      Hemoglobin 8.1 g/dL      Hematocrit 25.5 %      MCV 85.3 fL      MCH 27.1 pg      MCHC 31.8 g/dL      RDW 13.3 %      RDW-SD 41.4 fl      MPV 11.6 fL      Platelets 193 10*3/mm3      Neutrophil % 76.8 %      Lymphocyte % 13.6 %      Monocyte % 8.2 %      Eosinophil % 0.4 %      Basophil % 0.3 %      Immature Grans % 0.7 %      Neutrophils, Absolute 10.36 10*3/mm3      Lymphocytes, Absolute 1.83 10*3/mm3      Monocytes, Absolute 1.11 10*3/mm3      Eosinophils, Absolute 0.06 10*3/mm3      Basophils, Absolute 0.04 10*3/mm3      Immature Grans, Absolute 0.10 10*3/mm3      nRBC 0.0 /100 WBC             Exam:   Gen: NAD, cooperative, conversive  Abd: Soft, nondistended, fundus is firm below umbilicus, approp tender  Ext: Nontender bilaterally  Lochia normal        Principal Problem:    Pregnancy  Active Problems:    Rubella non-immune status, antepartum    Maternal varicella, non-immune    Uterine size date discrepancy pregnancy    Gestational hypertension, third trimester    Vaginal delivery       Assessment::   Yumi Gardner is a 24 y.o. female  s/p Vaginal, Spontaneous       1. PPD#1 , Doing well, blood pressures trending in a downward direction patient remains asymptomatic  2. Precautions given  3. Routine post partum  care discussed  4. Ambulation encouraged.         keyana Castro MD     2024 14:23 EDT

## 2024-06-14 VITALS
DIASTOLIC BLOOD PRESSURE: 86 MMHG | HEIGHT: 66 IN | WEIGHT: 198 LBS | HEART RATE: 97 BPM | SYSTOLIC BLOOD PRESSURE: 138 MMHG | TEMPERATURE: 98.2 F | RESPIRATION RATE: 16 BRPM | BODY MASS INDEX: 31.82 KG/M2 | OXYGEN SATURATION: 99 %

## 2024-06-14 PROCEDURE — 90707 MMR VACCINE SC: CPT | Performed by: OBSTETRICS & GYNECOLOGY

## 2024-06-14 PROCEDURE — 25010000002 MEASLES, MUMPS & RUBELLA VAC RECONSTITUTED SOLUTION: Performed by: OBSTETRICS & GYNECOLOGY

## 2024-06-14 PROCEDURE — 90471 IMMUNIZATION ADMIN: CPT | Performed by: OBSTETRICS & GYNECOLOGY

## 2024-06-14 RX ORDER — TRAMADOL HYDROCHLORIDE 50 MG/1
50 TABLET ORAL EVERY 6 HOURS PRN
Qty: 8 TABLET | Refills: 0 | Status: SHIPPED | OUTPATIENT
Start: 2024-06-14

## 2024-06-14 RX ORDER — LABETALOL 100 MG/1
100 TABLET, FILM COATED ORAL EVERY 12 HOURS SCHEDULED
Qty: 60 TABLET | Refills: 0 | Status: SHIPPED | OUTPATIENT
Start: 2024-06-14

## 2024-06-14 RX ORDER — LABETALOL 100 MG/1
100 TABLET, FILM COATED ORAL EVERY 12 HOURS SCHEDULED
Status: DISCONTINUED | OUTPATIENT
Start: 2024-06-14 | End: 2024-06-14 | Stop reason: HOSPADM

## 2024-06-14 RX ORDER — IBUPROFEN 600 MG/1
600 TABLET ORAL EVERY 6 HOURS PRN
Qty: 30 TABLET | Refills: 0 | Status: SHIPPED | OUTPATIENT
Start: 2024-06-14

## 2024-06-14 RX ORDER — FERROUS SULFATE 325(65) MG
325 TABLET ORAL
Qty: 30 TABLET | Refills: 0 | Status: SHIPPED | OUTPATIENT
Start: 2024-06-14

## 2024-06-14 RX ADMIN — MEASLES, MUMPS, AND RUBELLA VIRUS VACCINE LIVE 0.5 ML: 1000; 12500; 1000 INJECTION, POWDER, LYOPHILIZED, FOR SUSPENSION SUBCUTANEOUS at 12:30

## 2024-06-14 RX ADMIN — LABETALOL HYDROCHLORIDE 100 MG: 100 TABLET, FILM COATED ORAL at 11:01

## 2024-06-14 RX ADMIN — Medication: at 11:05

## 2024-06-14 RX ADMIN — DOCUSATE SODIUM 100 MG: 100 CAPSULE, LIQUID FILLED ORAL at 09:26

## 2024-06-14 RX ADMIN — Medication 1 TABLET: at 09:26

## 2024-06-14 RX ADMIN — ACETAMINOPHEN 325MG 650 MG: 325 TABLET ORAL at 01:35

## 2024-06-14 RX ADMIN — IBUPROFEN 600 MG: 600 TABLET, FILM COATED ORAL at 11:01

## 2024-06-14 NOTE — PROGRESS NOTES
"Postpartum Vaginal Delivery Note PPD# 2    CC: post vaginal delivery    Subjective:  Patient reports that she had seen some \"floaters\" day ago but nothing now.  No headaches.  She does have some tenderness where her epidural site is.  She finds that the Tylenol and Motrin do not help it.  She took narcotic pain medication for it.  She would like to go home today.  Patient ambulating.  No leg pain.      Vitals:   Patient Vitals for the past 24 hrs:   BP Temp Temp src Pulse Resp   06/14/24 0736 138/86 98.2 °F (36.8 °C) Oral 97 16   06/14/24 0311 121/79 97.6 °F (36.4 °C) Oral 90 16   06/13/24 2341 129/77 98.1 °F (36.7 °C) Oral 97 16   06/13/24 1953 129/86 97.6 °F (36.4 °C) Oral 101 16   06/13/24 1523 117/74 97.6 °F (36.4 °C) Oral 81 16   06/13/24 1156 115/73 -- -- 87 --   06/13/24 1125 145/86 98 °F (36.7 °C) Oral 88 16         Exam:   Gen: NAD, cooperative, conversive  Resp: Nonlabored breathing  Abd: Soft, nondistended, fundus is firm below umbillicus, nontender  CV: 2  + LE edema  Ext: Nontender bilaterally  Labs:  Recent Results (from the past 36 hour(s))   CBC Auto Differential    Collection Time: 06/13/24  5:54 AM    Specimen: Blood   Result Value Ref Range    WBC 13.50 (H) 3.40 - 10.80 10*3/mm3    RBC 2.99 (L) 3.77 - 5.28 10*6/mm3    Hemoglobin 8.1 (L) 12.0 - 15.9 g/dL    Hematocrit 25.5 (L) 34.0 - 46.6 %    MCV 85.3 79.0 - 97.0 fL    MCH 27.1 26.6 - 33.0 pg    MCHC 31.8 31.5 - 35.7 g/dL    RDW 13.3 12.3 - 15.4 %    RDW-SD 41.4 37.0 - 54.0 fl    MPV 11.6 6.0 - 12.0 fL    Platelets 193 140 - 450 10*3/mm3    Neutrophil % 76.8 (H) 42.7 - 76.0 %    Lymphocyte % 13.6 (L) 19.6 - 45.3 %    Monocyte % 8.2 5.0 - 12.0 %    Eosinophil % 0.4 0.3 - 6.2 %    Basophil % 0.3 0.0 - 1.5 %    Immature Grans % 0.7 (H) 0.0 - 0.5 %    Neutrophils, Absolute 10.36 (H) 1.70 - 7.00 10*3/mm3    Lymphocytes, Absolute 1.83 0.70 - 3.10 10*3/mm3    Monocytes, Absolute 1.11 (H) 0.10 - 0.90 10*3/mm3    Eosinophils, Absolute 0.06 0.00 - 0.40 " 10*3/mm3    Basophils, Absolute 0.04 0.00 - 0.20 10*3/mm3    Immature Grans, Absolute 0.10 (H) 0.00 - 0.05 10*3/mm3    nRBC 0.0 0.0 - 0.2 /100 WBC         Pregnancy    Rubella non-immune status, antepartum    Maternal varicella, non-immune    Uterine size date discrepancy pregnancy    Gestational hypertension, third trimester    Vaginal delivery        Assessment and Plan:  Yumi Gardner is a 24 y.o. female  s/p   Blood pressures are normal but several are borderline.  Recommend starting labetalol 100 mg twice daily and come in for blood pressure check in the office in 1 week    Patient has some muscular pain on her back.  Will send the patient home with Ultram but encouraged Tylenol and Motrin.  Patient could also do local therapy with ice or heat    Anemia with hemoglobin of 8.1-discussed with patient and recommend iron.    1. Dc home today  2. Precautions given-pelvic rest for 6 weeks  3. RTC in 6 weeks.   4. Ambulation encouraged.         Signed By:  Raymond Ibarra MD       2024 09:08 EDT

## 2024-06-14 NOTE — PAYOR COMM NOTE
"Ernesto Caballero (24 y.o. Female)     ATTN: NURSE REVIEWER  RE: MATERNITY INPATIENT AUTH REQUEST FOR ADDITIONAL DYS EXCEEDED DELIVERY DYS  REF#[id number]  PLS REPLY TO [facility] [phone] FAX# [phone]             Date of Birth   2000    Social Security Number       Address   [address] [place] [address]    Home Phone   [phone]    MRN   [id number]       [facility]    Marital Status   Single                            Admission Date   6/10/24    Admission Type   Emergency    Admitting Provider   Sondra Swenson MD    Attending Provider       Department, Room/Bed   [address], S319/1       Discharge Date   2024    Discharge Disposition   Home or Self Care    Discharge Destination                                 Attending Provider: (none)   Allergies: Adhesive Tape    Isolation: None   Infection: None   Code Status: CPR    Ht: 167.6 cm (66\")   Wt: 89.8 kg (198 lb)    Admission Cmt: None   Principal Problem: Pregnancy [Z34.90]                   Active Insurance as of 6/10/2024       Primary Coverage       Payor Plan Insurance Group Employer/Plan Group    [facility] [employer] BY NIEVES [id number]       Payor Plan Address Payor Plan Phone Number Payor Plan Fax Number Effective Dates    [address] [address]   10/1/2023 - None Entered    [facility] [phone]         Subscriber Name Subscriber Birth Date Member ID       ERNESTO CABALLERO 2000 [id number]                     Emergency Contacts        (Rel.) Home Phone Work Phone Mobile Phone    Vinay Caballero (Grandparent) [phone] -- --                 History & Physical        Edelmira Gant MD at 24 0102          [facility]   Obstetrics and Gynecology   History & Physical    2024    Patient: Ernesto Caballero          MR#:[id number]    Chief complaint:  gestational hypertension    Subjective    24 y.o. female  at 38w2d " presents with new onset elevated blood pressures.  She reports seeing black spots this afternoon and checking BP.  BP was severe range so she came to delivery.  She has had multiple mild range BP and one unsustained severe range BP.  She is not taking any antihypertensives.    Pregnancy is complicated by rubella and varicella nonimmune status.      Patient Active Problem List   Diagnosis    Rubella non-immune status, antepartum    Maternal varicella, non-immune    Pregnancy    Uterine size date discrepancy pregnancy    Gestational hypertension, third trimester       Past Medical History:   Diagnosis Date    Anxiety 2016    Healthy adult on routine physical examination     Pregnancy 2024       Past Surgical History:   Procedure Laterality Date    NO PAST SURGERIES      TONSILLECTOMY      WISDOM TOOTH EXTRACTION Bilateral        Obstetric History:  OB History          1    Para   0    Term   0       0    AB   0    Living   0         SAB   0    IAB   0    Ectopic   0    Molar   0    Multiple   0    Live Births   0          Obstetric Comments   23 - IUP at 8-4 weeks by US - MATHEW 24 - [facility] -   24 - 20-2 weeks - Normal but Incomplete anatomy - CL - 3.3 cm - [facility]   3/5/24 - 24-2 weeks - EFW 47%, AC 56 % - Normal completed anatomy -F   24 - 35-2 weeks EFW 56%, AC 72% - [facility]               Menstrual History:     Patient's last menstrual period was 2023 (approximate).       # 1 - Date: None, Sex: None, Weight: None, GA: None, Type: None, Apgar1: None, Apgar5: None, Living: None, Birth Comments: None      Prenatal Information:  Prenatal Results       Initial Prenatal Labs       Test Value Reference Range Date Time    Hemoglobin  12.9 g/dL 11.1 - 15.9 23 1514    Hematocrit  38.5 % 34.0 - 46.6 23 1514    Platelets  314 x10E3/uL 150 - 450 23 151    Rubella IgG  <0.90 index Immune >0.99 23 151    Hepatitis B SAg  Negative  Negative 23 151    Hepatitis C  Ab  Non Reactive  Non Reactive 11/16/23 1514    RPR  Non Reactive  Non Reactive 04/01/24 1030       Non Reactive  Non Reactive 11/16/23 1514    T. Pallidum Ab   Non-Reactive  Non-Reactive 06/10/24 2232    ABO  O   06/10/24 2232    Rh  Positive   06/10/24 2232    Antibody Screen  Negative  Negative 11/16/23 1514    HIV  Non Reactive  Non Reactive 11/16/23 1514    Urine Culture  Final report   11/16/23 1545    Gonorrhea  Negative  Negative 05/30/24 1410       Negative  Negative 01/11/24 1141       Negative  Negative 11/16/23 1613       Negative  Negative 10/17/23 1513    Chlamydia  Negative  Negative 05/30/24 1410       Negative  Negative 01/11/24 1141       Negative  Negative 11/16/23 1613       Negative  Negative 10/17/23 1513    TSH        HgB A1c   5.3 % 4.8 - 5.6 11/16/23 1514    Varicella IgG  <135 index Immune >165 11/16/23 1514    HgB Electrophoresis         Hemoglobinopathy (genetic testing)        Cystic fibrosis                   Fetal testing        Test Value Reference Range Date Time    NIPT        MSAFP        AFP-4                  2nd and 3rd Trimester       Test Value Reference Range Date Time    Hemoglobin (repeated)  10.1 g/dL 12.0 - 15.9 06/10/24 2112       9.9 g/dL 12.0 - 15.9 05/21/24 1549       11.1 g/dL 11.1 - 15.9 04/01/24 1030    Hematocrit (repeated)  30.8 % 34.0 - 46.6 06/10/24 2112       29.5 % 34.0 - 46.6 05/21/24 1549       34.1 % 34.0 - 46.6 04/01/24 1030    Platelets   252 10*3/mm3 140 - 450 06/10/24 2112       244 10*3/mm3 140 - 450 05/21/24 1549       260 x10E3/uL 150 - 450 04/01/24 1030       314 x10E3/uL 150 - 450 11/16/23 1514    1 hour GTT   118 mg/dL 70 - 139 04/01/24 1030    Antibody Screen (repeated)  Negative   06/10/24 2232    3rd TM syphilis scrn (repeated)  RPR   Non Reactive  Non Reactive 04/01/24 1030    3rd TM syphilis scrn (repeated) FTA        GTT Fasting        GTT 1 Hr        GTT 2 Hr        GTT 3 Hr        Group B Strep  Negative  Negative 05/30/24 1450               Other testing        Test Value Reference Range Date Time    Parvo IgG         CMV IgG                   Drug Screening       Test Value Reference Range Date Time    Amphetamine Screen        Barbiturate Screen        Benzodiazepine Screen        Methadone Screen        Phencyclidine Screen        Opiates Screen        THC Screen        Cocaine Screen        Propoxyphene Screen        Buprenorphine Screen        Methamphetamine Screen        Oxycodone Screen        Tricyclic Antidepressants Screen                  Legend    ^: Historical                          External Prenatal Results       Pregnancy Outside Results - Transcribed From Office Records - See Scanned Records For Details       Test Value Date Time    ABO  O  06/10/24 2232    Rh  Positive  06/10/24 2232    Antibody Screen  Negative  06/10/24 2232       Negative  11/16/23 1514    Varicella IgG  <135 index 11/16/23 1514    Rubella  <0.90 index 11/16/23 1514    Hgb  10.1 g/dL 06/10/24 2112       9.9 g/dL 05/21/24 1549       11.1 g/dL 04/01/24 1030       12.9 g/dL 11/16/23 1514    Hct  30.8 % 06/10/24 2112       29.5 % 05/21/24 1549       34.1 % 04/01/24 1030       38.5 % 11/16/23 1514    HgB A1c   5.3 % 11/16/23 1514    1h GTT  118 mg/dL 04/01/24 1030    3h GTT Fasting       3h GTT 1 hour       3h GTT 2 hour       3h GTT 3 hour        Gonorrhea (discrete)  Negative  05/30/24 1410       Negative  01/11/24 1141       Negative  11/16/23 1613       Negative  10/17/23 1513    Chlamydia (discrete)  Negative  05/30/24 1410       Negative  01/11/24 1141       Negative  11/16/23 1613       Negative  10/17/23 1513    RPR  Non Reactive  04/01/24 1030       Non Reactive  11/16/23 1514    Syphilis Antibody       HBsAg  Negative  11/16/23 1514    Herpes Simplex Virus PCR       Herpes Simplex VIrus Culture       HIV  Non Reactive  11/16/23 1514    Hep C RNA Quant PCR       Hep C Antibody  Non Reactive  11/16/23 1514    AFP       NIPT       Cystic Fibroisis         Group B Strep  Negative  05/30/24 1451    GBS Susceptibility to Clindamycin       GBS Susceptibility to Erythromycin       Fetal Fibronectin       Genetic Testing, Maternal Blood                 Drug Screening       Test Value Date Time    Urine Drug Screen       Amphetamine Screen       Barbiturate Screen       Benzodiazepine Screen       Methadone Screen       Phencyclidine Screen       Opiates Screen       THC Screen       Cocaine Screen       Propoxyphene Screen       Buprenorphine Screen       Methamphetamine Screen       Oxycodone Screen       Tricyclic Antidepressants Screen                 Legend    ^: Historical                              Family History   Problem Relation Age of Onset    No Known Problems Father     No Known Problems Mother     Hearing loss Cousin     Hearing loss Cousin     Breast cancer Neg Hx     Ovarian cancer Neg Hx     Uterine cancer Neg Hx     Colon cancer Neg Hx        Social History     Tobacco Use    Smoking status: Never    Smokeless tobacco: Never   Vaping Use    Vaping status: Never Used   Substance Use Topics    Alcohol use: No    Drug use: No       Adhesive tape      Current Facility-Administered Medications:     acetaminophen (TYLENOL) tablet 650 mg, 650 mg, Oral, Q4H PRN, Edelmira Gant MD    famotidine (PEPCID) injection 20 mg, 20 mg, Intravenous, BID PRN **OR** famotidine (PEPCID) tablet 20 mg, 20 mg, Oral, BID PRN, Edelmira Gant MD    lactated ringers infusion, 125 mL/hr, Intravenous, Continuous, Edelmira Gant MD    lidocaine (XYLOCAINE) 1 % injection 0.5 mL, 0.5 mL, Intradermal, Once PRN, Edelmira Gant MD    mineral oil liquid 30 mL, 30 mL, Topical, Once PRN, Edelmira Gant MD    morphine injection 1 mg, 1 mg, Intravenous, Q4H PRN **AND** naloxone (NARCAN) injection 0.4 mg, 0.4 mg, Intravenous, Q5 Min PRN, Edelmira Gant MD    ondansetron ODT (ZOFRAN-ODT) disintegrating tablet 4 mg, 4 mg, Oral, Q6H PRN **OR** ondansetron  (ZOFRAN) injection 4 mg, 4 mg, Intravenous, Q6H PRN, Edelmira Gant MD    sodium chloride 0.9 % flush 10 mL, 10 mL, Intravenous, Q12H, Sondra Swenson MD    sodium chloride 0.9 % flush 10 mL, 10 mL, Intravenous, PRN, Sondra Swenson MD    sodium chloride 0.9 % flush 10 mL, 10 mL, Intravenous, Q12H, Edelmira Gant MD    sodium chloride 0.9 % flush 10 mL, 10 mL, Intravenous, PRN, Edelmira Gant MD    sodium chloride 0.9 % infusion 40 mL, 40 mL, Intravenous, PRN, Sondra Swenson MD    sodium chloride 0.9 % infusion 40 mL, 40 mL, Intravenous, PRNAlfreda Avital O'Brien, MD    terbutaline (BRETHINE) injection 0.25 mg, 0.25 mg, Subcutaneous, PRN, Edelmira Gant MD    Review of Systems  Review of Systems   All other systems reviewed and are negative.      Objective    Vital Signs  Temp:  [97.8 °F (36.6 °C)-98.5 °F (36.9 °C)] 98.5 °F (36.9 °C)  Heart Rate:  [] 87  Resp:  [18] 18  BP: (138-163)/() 138/88    Physical Exam:  Physical Exam  Vitals and nursing note reviewed.   Constitutional:       General: She is not in acute distress.     Appearance: Normal appearance.   HENT:      Head: Normocephalic and atraumatic.   Eyes:      Extraocular Movements: Extraocular movements intact.   Cardiovascular:      Rate and Rhythm: Normal rate.   Pulmonary:      Effort: Pulmonary effort is normal. No respiratory distress.   Abdominal:      General: There is no distension.      Palpations: Abdomen is soft. There is no mass.      Tenderness: There is no abdominal tenderness.   Musculoskeletal:         General: Normal range of motion.      Cervical back: Normal range of motion.   Skin:     General: Skin is warm and dry.   Neurological:      General: No focal deficit present.      Mental Status: She is alert and oriented to person, place, and time.   Psychiatric:         Mood and Affect: Mood normal.         Behavior: Behavior normal.           Hospital problem list:    Pregnancy     "Rubella non-immune status, antepartum    Maternal varicella, non-immune    Uterine size date discrepancy pregnancy    Gestational hypertension, third trimester      Assessment & Plan    1. Intrauterine pregnancy at 38w2d presents with gestational hypertension  -Recommend proceeding with induction of labor.  Patient amenable.  -Reviewed procedure with patient.  I discussed the risks including but not limited to bleeding, infection and damage to internal organs.  Understanding of the procedure is voiced.   -GBS neg  -Cervadil ordered    Dispo: admit for delivery    Edelmira Gant MD  06/11/24  01:05 EDT      Patient Care Team:  Travon Hughes MD as PCP - General (Family Medicine)            Electronically signed by Edelmira Gant MD at 06/11/24 0106          Physician Progress Notes (last 72 hours)        Raymond Ibarra MD at 06/14/24 0908          Postpartum Vaginal Delivery Note PPD# 2    CC: post vaginal delivery    Subjective:  Patient reports that she had seen some \"floaters\" day ago but nothing now.  No headaches.  She does have some tenderness where her epidural site is.  She finds that the Tylenol and Motrin do not help it.  She took narcotic pain medication for it.  She would like to go home today.  Patient ambulating.  No leg pain.      Vitals:   Patient Vitals for the past 24 hrs:   BP Temp Temp src Pulse Resp   06/14/24 0736 138/86 98.2 °F (36.8 °C) Oral 97 16   06/14/24 0311 121/79 97.6 °F (36.4 °C) Oral 90 16   06/13/24 2341 129/77 98.1 °F (36.7 °C) Oral 97 16   06/13/24 1953 129/86 97.6 °F (36.4 °C) Oral 101 16   06/13/24 1523 117/74 97.6 °F (36.4 °C) Oral 81 16   06/13/24 1156 115/73 -- -- 87 --   06/13/24 1125 145/86 98 °F (36.7 °C) Oral 88 16         Exam:   Gen: NAD, cooperative, conversive  Resp: Nonlabored breathing  Abd: Soft, nondistended, fundus is firm below umbillicus, nontender  CV: 2  + LE edema  Ext: Nontender bilaterally  Labs:  Recent Results (from the past 36 hour(s)) "   CBC Auto Differential    Collection Time: 24  5:54 AM    Specimen: Blood   Result Value Ref Range    WBC 13.50 (H) 3.40 - 10.80 10*3/mm3    RBC 2.99 (L) 3.77 - 5.28 10*6/mm3    Hemoglobin 8.1 (L) 12.0 - 15.9 g/dL    Hematocrit 25.5 (L) 34.0 - 46.6 %    MCV 85.3 79.0 - 97.0 fL    MCH 27.1 26.6 - 33.0 pg    MCHC 31.8 31.5 - 35.7 g/dL    RDW 13.3 12.3 - 15.4 %    RDW-SD 41.4 37.0 - 54.0 fl    MPV 11.6 6.0 - 12.0 fL    Platelets 193 140 - 450 10*3/mm3    Neutrophil % 76.8 (H) 42.7 - 76.0 %    Lymphocyte % 13.6 (L) 19.6 - 45.3 %    Monocyte % 8.2 5.0 - 12.0 %    Eosinophil % 0.4 0.3 - 6.2 %    Basophil % 0.3 0.0 - 1.5 %    Immature Grans % 0.7 (H) 0.0 - 0.5 %    Neutrophils, Absolute 10.36 (H) 1.70 - 7.00 10*3/mm3    Lymphocytes, Absolute 1.83 0.70 - 3.10 10*3/mm3    Monocytes, Absolute 1.11 (H) 0.10 - 0.90 10*3/mm3    Eosinophils, Absolute 0.06 0.00 - 0.40 10*3/mm3    Basophils, Absolute 0.04 0.00 - 0.20 10*3/mm3    Immature Grans, Absolute 0.10 (H) 0.00 - 0.05 10*3/mm3    nRBC 0.0 0.0 - 0.2 /100 WBC         Pregnancy    Rubella non-immune status, antepartum    Maternal varicella, non-immune    Uterine size date discrepancy pregnancy    Gestational hypertension, third trimester    Vaginal delivery        Assessment and Plan:  Yumi Gardner is a 24 y.o. female  s/p   Blood pressures are normal but several are borderline.  Recommend starting labetalol 100 mg twice daily and come in for blood pressure check in the office in 1 week    Patient has some muscular pain on her back.  Will send the patient home with Ultram but encouraged Tylenol and Motrin.  Patient could also do local therapy with ice or heat    Anemia with hemoglobin of 8.1-discussed with patient and recommend iron.    1. Dc home today  2. Precautions given-pelvic rest for 6 weeks  3. RTC in 6 weeks.   4. Ambulation encouraged.         Signed By:  Raymond Ibarra MD       2024 09:08 EDT          Electronically signed by Raymond Ibarra MD  "at 24 0973       Link, Mo CONCEPCION MD at 24 1423                               Postpartum  Day#1    Subjective:    Chief Complaint   Patient presents with    Hypertension     Ar[facility] to [facility] with complaints of elevated blood pressures at home, 180/116 initially and then was 143/108 about 1 hour later.  Baby active, denies rupture is membranes, denies vaginal bleeding.  Had noticed \"floaters\" around 330pm none currently,  3+ reflexes, 2+ pitting edema,  no clonus, denies headache, or epigastic pain.  Lungs clear.     Pt doing well. Pain well controlled. Lochia normal. Ambulating well. Tolerating regular diet. Voiding without difficulty. No complaints    OB History    Para Term  AB Living   1 1 1 0 0 1   SAB IAB Ectopic Molar Multiple Live Births   0 0 0 0 0 1      # Outcome Date GA Lbr Devendra/2nd Weight Sex Type Anes PTL Lv   1 Term 24 38w3d 34:00 / 01:06 2950 g (6 lb 8.1 oz) F Vag-Spont EPI N ZENAIDA      Birth Comments: panda lr 3      Obstetric Comments   23 - IUP at 8-4 weeks by US - MATHEW 24 - [facility] -    24 - 20-2 weeks - Normal but Incomplete anatomy - CL - 3.3 cm - [facility]    3/5/24 - 24-2 weeks - EFW 47%, AC 56 % - Normal completed anatomy -[facility]    24 - 35-2 weeks EFW 56%, AC 72% - [facility]         Vitals:   Wt Readings from Last 3 Encounters:   06/10/24 89.8 kg (198 lb)   24 88.5 kg (195 lb)   24 87.1 kg (192 lb)     Temp Readings from Last 3 Encounters:   24 98 °F (36.7 °C) (Oral)   24 98.9 °F (37.2 °C) (Oral)   24 97.5 °F (36.4 °C) (Oral)     BP Readings from Last 3 Encounters:   24 115/73   24 124/86   24 126/80     Pulse Readings from Last 3 Encounters:   24 87   24 88   24 96   ROS:      ROS:Pulm: neg for soa  GI: neg for heavy bleeding              Musculoskel: neg for leg pain        LABS:  Lab Results (last 24 hours)       Procedure Component Value Units Date/Time    CBC & Differential [[id number]]  " (Abnormal) Collected: 2454    Specimen: Blood Updated: 24    Narrative:      The following orders were created for panel order CBC & Differential.  Procedure                               Abnormality         Status                     ---------                               -----------         ------                     CBC Auto Differential[[id number]]        Abnormal            Final result                 Please view results for these tests on the individual orders.    CBC Auto Differential [[id number]]  (Abnormal) Collected: 24    Specimen: Blood Updated: 24     WBC 13.50 10*3/mm3      RBC 2.99 10*6/mm3      Hemoglobin 8.1 g/dL      Hematocrit 25.5 %      MCV 85.3 fL      MCH 27.1 pg      MCHC 31.8 g/dL      RDW 13.3 %      RDW-SD 41.4 fl      MPV 11.6 fL      Platelets 193 10*3/mm3      Neutrophil % 76.8 %      Lymphocyte % 13.6 %      Monocyte % 8.2 %      Eosinophil % 0.4 %      Basophil % 0.3 %      Immature Grans % 0.7 %      Neutrophils, Absolute 10.36 10*3/mm3      Lymphocytes, Absolute 1.83 10*3/mm3      Monocytes, Absolute 1.11 10*3/mm3      Eosinophils, Absolute 0.06 10*3/mm3      Basophils, Absolute 0.04 10*3/mm3      Immature Grans, Absolute 0.10 10*3/mm3      nRBC 0.0 /100 WBC             Exam:   Gen: NAD, cooperative, conversive  Abd: Soft, nondistended, fundus is firm below umbilicus, approp tender  Ext: Nontender bilaterally  Lochia normal        Principal Problem:    Pregnancy  Active Problems:    Rubella non-immune status, antepartum    Maternal varicella, non-immune    Uterine size date discrepancy pregnancy    Gestational hypertension, third trimester    Vaginal delivery       Assessment::   Yumi Gardner is a 24 y.o. female  s/p Vaginal, Spontaneous       1. PPD#1 , Doing well, blood pressures trending in a downward direction patient remains asymptomatic  2. Precautions given  3. Routine post partum  care discussed  4. Ambulation  "encouraged.         keyana Castro MD     June 13, 2024 14:23 EDT        Electronically signed by Mo Castro MD at 06/13/24 1423       Jyoti Knox MD at 06/12/24 0845          Patient resting comfortably with epidural.  She received Cervidil on admission and then was switched to Pitocin.  She did have a prolonged deceleration overnight but fetal tracing is currently reassuring.  Patient now 5 to 6 cm.  Intrauterine pressure catheter placed without difficulty.    Electronically signed by Jyoti Knox MD at 06/12/24 0846       Mary Simon MD at 06/12/24 0707          INTRAPARTUM EVALUATION    Subjective:    Pain is controlled with epidural.  AROM at 2 am with clear fluid. Pt received epidural thereafter .  She had a prolonged variable deceleration and pitocin turned off.  Cervix changed to 3-4 cm.   She is currently comfortable and pitocin resumed.     Objective:    /90   Pulse 89   Temp 98.4 °F (36.9 °C)   Resp 16   Ht 167.6 cm (66\")   Wt 89.8 kg (198 lb)   LMP 08/24/2023 (Approximate)   SpO2 99%   BMI 31.96 kg/m²     Cervix is 4cm/80%/-2    FHR tracing description: category 1 reassuring    [place]: contractions q 5 min    GBS status: Negative    Assessment and Plan:    Patient Active Problem List   Diagnosis    Rubella non-immune status, antepartum    Maternal varicella, non-immune    Pregnancy    Uterine size date discrepancy pregnancy    Gestational hypertension, third trimester       Labor Plan: continue pitocin.      Fetal Plan: reassuring tracing    GBS treatment: no     Other plan:     Blood pressures have been in normal to borderline range  Continue to increase Pitocin for a adequate labor pattern.  Anticipate vaginal delivery    Electronically signed by Mary Simon MD at 06/12/24 0711       Maternal Vitals (last 3 days) before discharge       Date/Time Temp Pulse Resp BP SpO2    06/14/24 0736 98.2 (36.8) 97 16 138/86 --    06/14/24 0311 97.6 " (36.4) 90 16 121/79 --    06/13/24 2341 98.1 (36.7) 97 16 129/77 --    06/13/24 1953 97.6 (36.4) 101 16 129/86 --    06/13/24 1523 97.6 (36.4) 81 16 117/74 --    06/13/24 1156 -- 87 -- 115/73 --    06/13/24 1125 98 (36.7) 88 16 145/86  --    BP: notified nurse at 06/13/24 1125    06/13/24 0751 98 (36.7) 94 16 135/90 --    06/13/24 0442 97.3 (36.3) 96 16 125/78 --    06/12/24 2356 97.8 (36.6) 88 16 138/91 --    06/12/24 2037 97.9 (36.6) 93 16 132/89 --    06/12/24 1645 98.6 (37) 83 18 138/88 --    06/12/24 1550 98.5 (36.9) 91 18 140/90 --    06/12/24 1510 98.6 (37) 88 18 144/91 --    06/12/24 1430 -- 91 -- 139/81 --    06/12/24 1415 -- 97 18 134/87 --    06/12/24 1400 -- 88 18 136/80 --    06/12/24 1345 -- 95 18 136/81 --    06/12/24 1330 -- 97 18 134/83 --    06/12/24 1315 -- 98 18 134/84 --    06/12/24 1300 -- 118 18 137/83 --    06/12/24 1241 98.3 (36.8) -- -- -- --    06/12/24 1234 -- -- -- -- --    Comment rows:    OBSERV: MD in room for delivery at 06/12/24 1234    06/12/24 1230 -- 91 -- 131/89 --    06/12/24 1215 -- 139 -- 131/72 --    06/12/24 1214 98.4 (36.9) 96 18 -- 99    06/12/24 1209 -- 110 -- -- 100    06/12/24 1200 98.3 (36.8) 92 -- 141/87 --    06/12/24 1159 -- 112 -- -- 100    06/12/24 1154 -- 99 -- -- 100    06/12/24 1149 -- 115 -- -- 99    06/12/24 1146 -- 96 -- 140/88 --    06/12/24 1145 -- 96 -- 140/88 --    Comment rows:    OBSERV: Patient began pushing at 06/12/24 1145    06/12/24 1144 -- 108 -- -- 100    06/12/24 1140 -- -- -- -- --    Comment rows:    OBSERV: Patient postioned in stirrups to start pushing at 06/12/24 1140    06/12/24 1139 -- 117 -- -- 100    06/12/24 1134 -- 113 -- -- 99    06/12/24 1130 -- 90 -- 121/65 --    06/12/24 1129 -- 101 -- -- 100    06/12/24 1119 -- 95 -- -- 100    06/12/24 1115 -- 92 -- 135/86 --    06/12/24 1109 -- 91 -- -- 100    06/12/24 1104 -- 99 -- 125/88 99    06/12/24 1100 -- 89 -- 117/70 --    Comment rows:    OBSERV: Rn at bedside to reposition patient,  adjust EFM due to decel at 06/12/24 1100    06/12/24 1059 -- 91 -- -- 99    06/12/24 1054 -- 89 -- -- 99    06/12/24 1049 -- 85 -- -- 97    06/12/24 1045 -- 88 -- 111/66 --    06/12/24 1044 -- 91 -- -- 98    06/12/24 1039 -- 88 -- -- 98    06/12/24 1034 -- 89 -- -- 98    06/12/24 1030 -- 95 -- 109/65 --    06/12/24 1029 -- 88 -- -- 99    06/12/24 1024 -- 89 -- -- 98    06/12/24 1019 -- 90 -- -- 98    06/12/24 1017 -- 86 -- 108/65 --    06/12/24 1000 97.8 (36.6) 106 16 121/77 --    06/12/24 0930 -- 91 -- 124/83 --    06/12/24 0929 -- 88 -- -- 99    06/12/24 0924 -- 90 -- -- 100    06/12/24 0919 -- 88 -- -- 100    06/12/24 0915 -- 90 -- 128/86 --    06/12/24 0914 -- 90 -- -- 100    06/12/24 0909 -- 95 -- -- 100    06/12/24 0904 -- 95 -- -- 100    06/12/24 0900 -- 90 -- 124/85 --    06/12/24 0859 -- 94 -- -- 100    06/12/24 0854 -- 93 -- -- 100    06/12/24 0849 -- 96 -- -- 99    06/12/24 0845 -- 83 -- 109/59 --    06/12/24 0844 -- 82 -- -- 99    06/12/24 0839 -- 79 -- -- 99    06/12/24 0837 -- -- -- -- 100    06/12/24 0836 97.7 (36.5) 81 -- -- --    06/12/24 0834 -- -- -- -- 100    06/12/24 0832 -- 81 -- 116/71 100    06/12/24 0830 -- 78 -- 116/71 --    06/12/24 0829 -- 81 -- -- 100    06/12/24 0824 -- 106 -- -- 99    06/12/24 0822 -- -- -- -- --    Comment rows:    OBSERV: Patient flat on back for IUPC insertion, VE, RN and MD at bedside. RN turning patient and readjusting EFM at 06/12/24 0822    06/12/24 0819 -- 119 -- -- 100    06/12/24 0815 -- 96 -- 126/86 --    06/12/24 0814 -- 93 -- -- 99    06/12/24 0807 -- 110 -- -- 99    06/12/24 0804 -- 110 -- -- 99    06/12/24 0800 -- 97 -- 126/83 --    06/12/24 0759 -- 117 -- -- 99    06/12/24 0754 -- 109 -- -- 98    06/12/24 0749 -- 93 -- -- 100    06/12/24 0745 -- 102 -- 124/80 --    06/12/24 0744 -- 111 -- -- 99    06/12/24 0739 -- 93 -- -- 99    06/12/24 0734 -- 90 -- -- 98    06/12/24 0730 -- 88 -- 127/82 98    06/12/24 0729 -- 90 -- -- 98    06/12/24 0724 -- 88 -- --  98    06/12/24 0719 -- 91 -- -- 99    06/12/24 0715 -- 88 18 133/84 99    06/12/24 0700 -- 85 -- -- --    06/12/24 0659 -- 89 -- 128/90 99    06/12/24 0654 -- 87 -- -- 98    06/12/24 0649 -- 85 -- -- 98    06/12/24 0645 -- 77 -- 126/84 --    06/12/24 0644 -- 85 -- -- 98    06/12/24 0639 -- 98 -- -- 99    06/12/24 0634 -- 78 -- -- 97    06/12/24 0630 -- 74 -- 104/60 --    06/12/24 0629 -- 77 -- -- 97    06/12/24 0624 98.4 (36.9) 79 -- -- 98    06/12/24 0619 -- 77 -- -- 97    06/12/24 0615 -- 78 -- 107/60 --    06/12/24 0614 -- 78 -- -- 97    06/12/24 0609 -- 83 -- -- 97    06/12/24 0604 -- 78 -- -- 97    06/12/24 0600 -- 85 -- 106/63 --    06/12/24 0559 -- 78 -- -- 98    06/12/24 0554 -- 81 -- -- 98    06/12/24 0549 -- 78 -- -- 98    06/12/24 0545 -- 82 -- 104/65 --    06/12/24 0544 -- 78 -- -- 99    06/12/24 0539 -- 79 -- -- 99    06/12/24 0534 -- 76 -- -- 99    06/12/24 0530 -- 80 -- 111/63 --    06/12/24 0529 -- 77 -- -- 99    06/12/24 0524 -- 92 -- -- 100    06/12/24 0519 -- 86 -- -- 98    06/12/24 0515 -- 90 -- 119/81 --    06/12/24 0514 -- 89 -- -- 99    06/12/24 0509 -- 87 -- -- 100    06/12/24 0504 -- 89 -- -- 100    06/12/24 0500 -- 90 -- 120/80 --    06/12/24 0459 -- 91 -- -- 100    06/12/24 0454 -- 92 -- -- 100    06/12/24 0449 -- 91 -- -- 100    06/12/24 0445 -- 94 -- 121/86 --    06/12/24 0444 -- 97 -- -- 100    06/12/24 0439 -- 81 -- -- 98    06/12/24 0434 -- 80 -- -- 99    06/12/24 0430 -- 89 -- 116/71 --    06/12/24 0429 -- 81 -- -- 99    06/12/24 0424 -- 85 -- -- 99    06/12/24 0419 -- 86 -- -- 99    06/12/24 0415 98.6 (74) 88 -- 121/74 --    06/12/24 0414 -- 87 -- -- 99    06/12/24 0409 -- 89 -- -- 98    06/12/24 0404 -- 104 -- -- 99    06/12/24 0400 -- 84 -- 109/71 --    06/12/24 0359 -- 108 -- -- 99    06/12/24 0355 -- 125 -- 119/73 --    06/12/24 0354 -- 110 -- -- 99    06/12/24 0350 -- 118 -- 120/75 --    06/12/24 0349 -- 105 -- -- 98    06/12/24 0345 -- 95 -- 116/73 --    06/12/24 0344 -- 98 --  -- 98    06/12/24 0340 -- 97 16 125/72 --    06/12/24 0339 -- 94 -- -- 98    06/12/24 0338 -- 96 -- 126/75 --    06/12/24 0336 -- 101 -- 127/88 --    06/12/24 0334 -- 88 -- 127/76 98    06/12/24 0333 -- 93 -- 130/85 --    06/12/24 0332 -- 99 -- 135/121 --    06/12/24 0330 -- 95 -- 141/88 --    06/12/24 0329 -- 93 -- -- 98    06/12/24 0324 -- 104 -- -- 99    06/12/24 0319 -- 110 -- -- 99    06/12/24 0300 -- 90 -- 164/93 --    06/12/24 0230 98.1 (36.7) 79 16 142/96 --    06/12/24 0200 -- 84 -- 117/71 --    06/12/24 0130 -- 91 16 139/97 --    06/12/24 0100 -- 102 -- 128/86 --    06/12/24 0030 -- 93 -- 131/82 --    06/12/24 0000 98.4 (36.9) 83 16 139/85 --    06/11/24 2354 -- 80 -- 138/80 --    06/11/24 2336 -- 108 -- 134/97 --    06/11/24 2300 -- 78 -- 122/63 --    06/11/24 2230 -- 83 -- 118/67 --    06/11/24 2202 -- 84 -- 137/91 --    06/11/24 2130 -- 84 -- 121/67 --    06/11/24 2128 -- 83 -- 127/66 --    06/11/24 2100 -- 84 -- 130/71 --    06/11/24 2048 -- 90 -- 133/91 --    06/11/24 2005 -- 93 -- 130/74 --    06/11/24 1932 -- 103 -- 143/92 --    06/11/24 1915 98.2 (36.8) 88 16 -- --    06/11/24 1900 -- 96 -- 135/96 --    06/11/24 1800 -- 95 -- 138/88 --    06/11/24 1740 -- 106 -- 139/95 --    06/11/24 1700 -- 86 -- 126/73 --    06/11/24 1600 98.2 (36.8) 102 16 140/84 --    06/11/24 1555 -- 95 -- 139/95 --    06/11/24 1338 -- -- -- -- --    Comment rows:    OBSERV: Pt off monitor to eat and shower; okayed per MD Christen Wagoner. at 06/11/24 1338    06/11/24 0930 -- 87 -- 138/84 --    06/11/24 0900 -- 88 -- 128/85 --    06/11/24 0830 -- 92 -- 121/71 --    06/11/24 0800 98.1 (36.7) 96 16 132/87 --    06/11/24 0730 -- 85 -- 130/81 --    06/11/24 0700 -- 88 -- 123/72 --    06/11/24 0637 -- 91 -- 146/98 --    06/11/24 0600 -- 97 -- 113/71 --    06/11/24 0530 -- 89 -- 133/91 --    06/11/24 0500 -- 87 -- 135/89 --    06/11/24 0430 -- 85 -- 127/75 --    06/11/24 0400 97.8 (36.6) -- 17 -- --    06/11/24 0330 -- 94 -- 132/83 --     06/11/24 0300 -- 84 -- 132/93 --    06/11/24 0230 -- 85 -- 142/95 --    06/11/24 0200 -- 84 -- 143/89 --    06/11/24 0133 -- 80 -- 149/91 --    06/11/24 0103 -- 88 -- 136/90 --          FHR (last 3 days)        Date/Time Fetal HR Assessment Method Fetal HR (beats/min) Fetal HR Baseline Fetal HR Variability Fetal HR Accelerations Fetal HR Decelerations Fetal HR Tracing Category    06/12/24 1238 external  130  normal range  moderate (amplitude range 6 to 25 bpm)  greater than/equal to 15 bpm;lasting at least 15 seconds  variable;late  Category II     06/12/24 1230 --  --  --  --  --  --  --     06/12/24 1200 --  125  --  moderate (amplitude range 6 to 25 bpm)  absent  variable  --     06/12/24 1145 --  135  --  minimal (detectable, amplitude less than or equal to 5 bpm)  --  --  --     06/12/24 1130 --  135  --  minimal (detectable, amplitude less than or equal to 5 bpm)  absent  prolonged  --     06/12/24 1100 external  140  normal range  moderate (amplitude range 6 to 25 bpm)  greater than/equal to 15 bpm;lasting at least 15 seconds  late  Category II     06/12/24 1030 external  140  normal range  moderate (amplitude range 6 to 25 bpm)  greater than/equal to 15 bpm;lasting at least 15 seconds  absent  Category I     06/12/24 1000 external  150  normal range  moderate (amplitude range 6 to 25 bpm)  greater than/equal to 15 bpm;lasting at least 15 seconds  variable  Category II     06/12/24 0930 external  140  normal range  moderate (amplitude range 6 to 25 bpm)  greater than/equal to 15 bpm;lasting at least 15 seconds  absent  Category I     06/12/24 0900 external  140  normal range  moderate (amplitude range 6 to 25 bpm)  greater than/equal to 15 bpm;lasting at least 15 seconds  absent  Category I     06/12/24 0830 external  135  normal range  moderate (amplitude range 6 to 25 bpm)  greater than/equal to 15 bpm;lasting at least 15 seconds  variable  Category II     06/12/24 0800 external  130  period of 135   normal range  moderate (amplitude range 6 to 25 bpm)  greater than/equal to 15 bpm;lasting at least 15 seconds  absent  Category I     06/12/24 0715 external  130  normal range  moderate (amplitude range 6 to 25 bpm)  greater than/equal to 15 bpm;lasting at least 15 seconds  absent  Category I     06/12/24 0700 external  130  normal range  moderate (amplitude range 6 to 25 bpm)  greater than/equal to 15 bpm;lasting at least 15 seconds  absent  --     06/12/24 0630 external  130  normal range  moderate (amplitude range 6 to 25 bpm)  greater than/equal to 15 bpm;lasting at least 15 seconds  absent  --     06/12/24 0600 external  130  normal range  minimal (detectable, amplitude less than or equal to 5 bpm)  greater than/equal to 15 bpm;lasting at least 15 seconds  absent  --     06/12/24 0530 external  135  normal range  minimal (detectable, amplitude less than or equal to 5 bpm)  greater than/equal to 15 bpm;lasting at least 15 seconds  absent  --     06/12/24 0500 external  135  normal range  minimal (detectable, amplitude less than or equal to 5 bpm)  greater than/equal to 15 bpm;lasting at least 15 seconds  variable  --     06/12/24 0430 external  135  normal range  minimal (detectable, amplitude less than or equal to 5 bpm)  absent  variable  --     06/12/24 0400 external  130  normal range  moderate (amplitude range 6 to 25 bpm)  greater than/equal to 15 bpm;lasting at least 15 seconds  prolonged;variable  --     06/12/24 0330 external  135  normal range  moderate (amplitude range 6 to 25 bpm)  greater than/equal to 15 bpm;lasting at least 15 seconds  absent  --     06/12/24 0300 external  125  normal range  moderate (amplitude range 6 to 25 bpm)  greater than/equal to 15 bpm;lasting at least 15 seconds  absent  --     06/12/24 0230 external  125  normal range  moderate (amplitude range 6 to 25 bpm)  greater than/equal to 15 bpm;lasting at least 15 seconds  absent  --     06/12/24 0200 external  140  normal  range  moderate (amplitude range 6 to 25 bpm)  greater than/equal to 15 bpm;lasting at least 15 seconds  absent  --     06/12/24 0130 external  140  normal range  moderate (amplitude range 6 to 25 bpm)  greater than/equal to 15 bpm;lasting at least 15 seconds  late  --     06/12/24 0100 external  140  normal range  moderate (amplitude range 6 to 25 bpm)  greater than/equal to 15 bpm;lasting at least 15 seconds  absent  --     06/12/24 0030 external  135  normal range  moderate (amplitude range 6 to 25 bpm)  prolonged;greater than/equal to 15 bpm;lasting at least 15 seconds  absent  --     06/12/24 0000 external  140  normal range  moderate (amplitude range 6 to 25 bpm)  greater than/equal to 15 bpm;lasting at least 15 seconds  absent  --     06/11/24 2330 external  140  normal range  moderate (amplitude range 6 to 25 bpm)  greater than/equal to 15 bpm;lasting at least 15 seconds  absent  --     06/11/24 2300 external  130  normal range  moderate (amplitude range 6 to 25 bpm)  greater than/equal to 15 bpm;lasting at least 15 seconds  absent  --     06/11/24 2230 external  130  normal range  moderate (amplitude range 6 to 25 bpm)  greater than/equal to 15 bpm;lasting at least 15 seconds  absent  --     06/11/24 2200 external  135  normal range  moderate (amplitude range 6 to 25 bpm)  greater than/equal to 15 bpm;lasting at least 15 seconds  absent  --     06/11/24 2130 external  135  normal range  moderate (amplitude range 6 to 25 bpm)  greater than/equal to 15 bpm;lasting at least 15 seconds  absent  --     06/11/24 2100 external  140  normal range  moderate (amplitude range 6 to 25 bpm)  greater than/equal to 15 bpm;lasting at least 15 seconds  absent  --     06/11/24 2030 external  135  normal range  moderate (amplitude range 6 to 25 bpm)  greater than/equal to 15 bpm;lasting at least 15 seconds  absent  --     06/11/24 2000 external  140  normal range  moderate (amplitude range 6 to 25 bpm)  greater than/equal to  15 bpm;lasting at least 15 seconds  absent  --     06/11/24 1930 external  135  normal range  moderate (amplitude range 6 to 25 bpm)  greater than/equal to 15 bpm;lasting at least 15 seconds  absent  --     06/11/24 1830 external  140  normal range  moderate (amplitude range 6 to 25 bpm)  greater than/equal to 15 bpm;lasting at least 15 seconds  absent  --     06/11/24 1800 external  140  normal range  moderate (amplitude range 6 to 25 bpm)  greater than/equal to 15 bpm;lasting at least 15 seconds  variable  --     06/11/24 1730 external  140  normal range  moderate (amplitude range 6 to 25 bpm)  greater than/equal to 15 bpm;lasting at least 15 seconds  absent  --     06/11/24 1700 external  135  normal range  moderate (amplitude range 6 to 25 bpm)  greater than/equal to 15 bpm;lasting at least 15 seconds  absent  --     06/11/24 1630 external  140  normal range  moderate (amplitude range 6 to 25 bpm)  greater than/equal to 15 bpm;lasting at least 15 seconds  absent  --     06/11/24 1600 external  140  normal range  moderate (amplitude range 6 to 25 bpm)  greater than/equal to 15 bpm;lasting at least 15 seconds  absent  --                   Uterine Activity (last 3 days)        Date/Time Method Contraction Frequency (Minutes) Contraction Duration (sec) Contraction Intensity Uterine Resting Tone Contraction Pattern    06/12/24 1238 IUPC (intrauterine pressure catheter)  2-4    moderate by palpation  soft by palpation  --     06/12/24 1230 --  --  --  --  --  --     06/12/24 1200 --  1.5-3    moderate by palpation  soft by palpation  --     06/12/24 1130 --  2-3  60-80  moderate by palpation  soft by palpation  --     06/12/24 1100 IUPC (intrauterine pressure catheter)  1.5-4.5  60-90  moderate by palpation  soft by palpation  --     06/12/24 1030 IUPC (intrauterine pressure catheter)  2-5  60-80  moderate by palpation  soft by palpation  --     06/12/24 1000 IUPC (intrauterine pressure catheter)  2.5-3   60-90  moderate by palpation  soft by palpation  --     06/12/24 0930 IUPC (intrauterine pressure catheter)  1.5-3  60-80  moderate by palpation  soft by palpation  --     06/12/24 0900 IUPC (intrauterine pressure catheter)  1.5-4    moderate by palpation  soft by palpation  --     06/12/24 0830 external tocotransducer;IUPC (intrauterine pressure catheter)  2-4  50-70  mild by palpation  soft by palpation  --     06/12/24 0820 IUPC (intrauterine pressure catheter)  --  --  --  --  --     06/12/24 0800 external tocotransducer  3-4.5    mild by palpation  soft by palpation  --     06/12/24 0715 external tocotransducer  x3  --  mild by palpation  soft by palpation  --     06/12/24 0700 external tocotransducer;per patient report;palpation  x2  --  mild by palpation  soft by palpation  --     06/12/24 0630 external tocotransducer;per patient report;palpation  x4  60-70  mild by palpation  soft by palpation  --     06/12/24 0600 external tocotransducer;per patient report;palpation  --  --  no contractions  soft by palpation  --     06/12/24 0530 external tocotransducer;per patient report;palpation  x1  50  mild by palpation  soft by palpation  --     06/12/24 0500 external tocotransducer;per patient report;palpation  x3  --  mild by palpation  soft by palpation  --     06/12/24 0430 external tocotransducer;per patient report;palpation  x4  --  moderate by palpation  soft by palpation  --     06/12/24 0400 external tocotransducer;per patient report;palpation  2-4  50-70  mild by palpation  soft by palpation  --     06/12/24 0330 external tocotransducer;per patient report;palpation  difficulty tracing contractions due to position (sitting for epidural)  --  --  --  --     06/12/24 0300 external tocotransducer;per patient report;palpation  1-4  50-80  mild by palpation  soft by palpation  --     06/12/24 0230 external tocotransducer;per patient report;palpation  2-4  60-80  mild by palpation  soft by palpation   --     06/12/24 0200 external tocotransducer;per patient report;palpation  x2  --  mild by palpation  soft by palpation  --     06/12/24 0130 external tocotransducer;per patient report;palpation  x4  --  mild by palpation  soft by palpation  --     06/12/24 0100 external tocotransducer;per patient report;palpation  --  --  no contractions  soft by palpation  --     06/12/24 0030 external tocotransducer;per patient report;palpation  1-5  50-70  mild by palpation  soft by palpation  --     06/12/24 0000 external tocotransducer;per patient report;palpation  1-4  50-80  mild by palpation  soft by palpation  --     06/11/24 2330 external tocotransducer;per patient report;palpation  patient reports contractions every 2-4 minutes, difficulty tracing due to pt ambulation in garcia  60-80  mild by palpation  soft by palpation  --     06/11/24 2300 external tocotransducer;per patient report;palpation  --  --  no contractions  soft by palpation  --     06/11/24 2230 external tocotransducer;per patient report;palpation  --  --  no contractions  soft by palpation  --     06/11/24 2200 external tocotransducer;per patient report;palpation  1-5  60-80  no contractions  soft by palpation  --     06/11/24 2130 external tocotransducer;per patient report;palpation  0  --  no contractions  soft by palpation  --     06/11/24 2100 external tocotransducer;per patient report;palpation  x2  60-80  mild by palpation  soft by palpation  --     06/11/24 2030 external tocotransducer;per patient report;palpation  2-6  50-70  mild by palpation  soft by palpation  --     06/11/24 2000 external tocotransducer;per patient report;palpation  x1  70  mild by palpation  soft by palpation  --     06/11/24 1930 external tocotransducer;per patient report;palpation  x4  x4 on monitor, pt reports no contractions but occasional cramping  --  --  no contractions palpated  soft by palpation  --     06/11/24 1830 external tocotransducer;per patient report;palpation   --  occasional ctx  --  mild by palpation  soft by palpation  --     06/11/24 1800 external tocotransducer;per patient report;palpation  1.5-4  40-60  mild by palpation  soft by palpation  --     06/11/24 1730 external tocotransducer;per patient report;palpation  2-5  40-60  mild by palpation  soft by palpation  --     06/11/24 1700 external tocotransducer;per patient report;palpation  --  --  no contractions  soft by palpation  --     06/11/24 1630 external tocotransducer;per patient report;palpation  --  --  no contractions  soft by palpation  --     06/11/24 1600 external tocotransducer;per patient report;palpation  --  --  no contractions  soft by palpation  --                   Labor Pain (last 3 days)        Date/Time (0-10) Pain Rating: Rest (0-10) Pain Rating: Activity Pain Management Interventions    06/14/24 1101 5  --  --     06/14/24 0135 6  --  see MAR     06/13/24 2349 5  7  see MAR     06/13/24 1815 1  --  position adjusted;pillow support provided     06/13/24 1727 4  --  see MAR     06/13/24 1500 1  2  --     06/13/24 1426 5  6  see MAR;heat applied     06/13/24 1230 0  --  --     06/13/24 1000 1  --  --     06/13/24 0915 4  --  see MAR     06/13/24 0819 1  --  see MAR     06/13/24 0559 0  --  --     06/13/24 0459 6  --  see MAR     06/12/24 2328 4  --  see MAR     06/12/24 2037 0  --  pain management plan reviewed with patient/caregiver     06/12/24 1801 --  1  cold applied     06/12/24 1703 4  --  see MAR     06/12/24 1550 1  --  cold applied     06/12/24 1510 0  --  --     06/12/24 1430 0  0  --     06/12/24 1415 0  0  --     06/12/24 1400 0  0  --     06/12/24 1345 0  0  --     06/12/24 1330 0  0  --     06/12/24 1315 0  0  --     06/12/24 1300 0  0  --     06/12/24 1214 0  --  --     06/12/24 1000 0  0  --     06/12/24 0900 0  0  --     06/12/24 0715 0  0  other (see comments)  epidural     06/12/24 0345 2  2  pillow support provided;see MAR     06/12/24 0223 7  7  --     06/12/24 0000 5  3   heat applied;pillow support provided     24 2300 6  6  heat applied;position adjusted;pillow support provided;other (see comments)  Pt reports increase in pain. Refuses SVE, heat pack applied and position adjusted     24 2111 3  3  --     24 1915 0  0  --     24 1615 0  0  --                      Discharge Summary        Raymond Ibarra MD at 24 0930          [facility]   Obstetrics and Gynecology   Vaginal delivery Discharge Summary      Date of Admission: 6/10/2024    Date of Discharge:        Patient: Yumi Gardner      MR#:[id number]    Surgeon/OB: Jyoti Knox     Discharge Diagnosis:   Vaginal Delivery at 38w3d, uncomplicated recovery    Pregnancy    Rubella non-immune status, antepartum    Maternal varicella, non-immune    Uterine size date discrepancy pregnancy    Gestational hypertension, third trimester    Vaginal delivery      Procedures:  Vaginal, Spontaneous     2024    12:38 PM      Anesthesia:  Epidural     Hospital Course  Patient is a 24 y.o. female  at 38w3d status post vaginal delivery.    Patient is ambulating, tolerating a regular diet.  Perineum is intact.  The patient has had some borderline blood pressures.  She will be discharged home on labetalol 100 mg twice daily.  She was instructed to check her blood pressure and to come back to the hospital for headache, visual changes or elevated blood pressure.  She has follow-up scheduled in the office with Dr. Knox in 4 days.  Patient needs her MMR prior to discharge.  I discussed this with the nurse.  Patient is agreeable.    Infant:   female  fetus 2950 g (6 lb 8.1 oz)  with Apgar scores of 8 , 9  at five minutes.    Condition on Discharge:  Stable    Vital Signs  Temp:  [97.6 °F (36.4 °C)-98.2 °F (36.8 °C)] 98.2 °F (36.8 °C)  Heart Rate:  [] 97  Resp:  [16] 16  BP: (115-145)/(73-86) 138/86    Results from last 7 days   Lab Units 24  0554 06/10/24  2112   WBC 10*3/mm3  13.50* 10.88*   HEMOGLOBIN g/dL 8.1* 10.1*   HEMATOCRIT % 25.5* 30.8*   PLATELETS 10*3/mm3 193 252     Results from last 7 days   Lab Units 06/10/24  2112   SODIUM mmol/L 136   POTASSIUM mmol/L 3.8   CHLORIDE mmol/L 104   CO2 mmol/L 19.7*   BUN mg/dL 5*   CREATININE mg/dL 0.59   CALCIUM mg/dL 8.5*   BILIRUBIN mg/dL <0.2   ALK PHOS U/L 172*   ALT (SGPT) U/L 10   AST (SGOT) U/L 12   GLUCOSE mg/dL 109*       Discharge Disposition  Home or Self Care    Discharge Medications     Discharge Medications        New Medications        Instructions Start Date   ferrous sulfate 325 (65 FE) MG tablet   325 mg, Oral, Daily With Breakfast      ibuprofen 600 MG tablet  Commonly known as: ADVIL,MOTRIN   600 mg, Oral, Every 6 Hours PRN      labetalol 100 MG tablet  Commonly known as: NORMODYNE   100 mg, Oral, Every 12 Hours Scheduled      traMADol 50 MG tablet  Commonly known as: Ultram   50 mg, Oral, Every 6 Hours PRN             Continue These Medications        Instructions Start Date   Prenatal Vitamin/Min +DHA 27-0.8-200 MG capsule   1 tablet, Oral, Daily               Discharge Diet: Regular    Activity at Discharge:     Follow-up Appointments  Future Appointments   Date Time Provider Department Center   6/18/2024  9:45 AM Jyoti Knox MD [facility]       Follow-up for blood pressure check    Prenatal labs/vax:   Immunization History   Administered Date(s) Administered    DTaP, Unspecified 01/13/1999, 03/15/1999, 12/03/1999    Hep B / HiB 01/13/1999    Hepatitis A 05/10/2018    HiB 12/03/1999    Hib (PRP-OMP) 03/15/1999    IPV 01/13/1999    MMR 09/03/1999    OPV 09/03/1999    Tdap 07/29/2019    Varicella 09/03/1999       External Prenatal Results       Pregnancy Outside Results - Transcribed From Office Records - See Scanned Records For Details       Test Value Date Time    ABO  O  06/10/24 2232    Rh  Positive  06/10/24 2232    Antibody Screen  Negative  06/10/24 2232       Negative  11/16/23 1514    Varicella  IgG  <135 index 11/16/23 1514    Rubella  <0.90 index 11/16/23 1514    Hgb  8.1 g/dL 06/13/24 0554       10.1 g/dL 06/10/24 2112       9.9 g/dL 05/21/24 1549       11.1 g/dL 04/01/24 1030       12.9 g/dL 11/16/23 1514    Hct  25.5 % 06/13/24 0554       30.8 % 06/10/24 2112       29.5 % 05/21/24 1549       34.1 % 04/01/24 1030       38.5 % 11/16/23 1514    HgB A1c   5.3 % 11/16/23 1514    1h GTT  118 mg/dL 04/01/24 1030    3h GTT Fasting       3h GTT 1 hour       3h GTT 2 hour       3h GTT 3 hour        Gonorrhea (discrete)  Negative  05/30/24 1410       Negative  01/11/24 1141       Negative  11/16/23 1613       Negative  10/17/23 1513    Chlamydia (discrete)  Negative  05/30/24 1410       Negative  01/11/24 1141       Negative  11/16/23 1613       Negative  10/17/23 1513    RPR  Non Reactive  04/01/24 1030       Non Reactive  11/16/23 1514    Syphilis Antibody       HBsAg  Negative  11/16/23 1514    Herpes Simplex Virus PCR       Herpes Simplex VIrus Culture       HIV  Non Reactive  11/16/23 1514    Hep C RNA Quant PCR       Hep C Antibody  Non Reactive  11/16/23 1514    AFP       NIPT       Cystic Fibroisis        Group B Strep  Negative  05/30/24 1451    GBS Susceptibility to Clindamycin       GBS Susceptibility to Erythromycin       Fetal Fibronectin       Genetic Testing, Maternal Blood                 Drug Screening       Test Value Date Time    Urine Drug Screen       Amphetamine Screen       Barbiturate Screen       Benzodiazepine Screen       Methadone Screen       Phencyclidine Screen       Opiates Screen       THC Screen       Cocaine Screen       Propoxyphene Screen       Buprenorphine Screen       Methamphetamine Screen       Oxycodone Screen       Tricyclic Antidepressants Screen                 Legend    ^: Historical                              Raymond Ibarra MD  06/14/24  09:30 EDT             Electronically signed by Raymond Ibarra MD at 06/14/24 0931

## 2024-06-14 NOTE — PLAN OF CARE
Problem: Adult Inpatient Plan of Care  Goal: Plan of Care Review  Outcome: Ongoing, Progressing  Flowsheets  Taken 6/14/2024 0709 by Haydee Garcia RN  Plan of Care Reviewed With:   patient   significant other  Outcome Evaluation: VSS, assessment WNL, up ad jocelin, pain controlled with po meds, d/c today.  Taken 6/12/2024 0536 by Faith Ashley RN  Progress: improving  Goal: Patient-Specific Goal (Individualized)  Outcome: Ongoing, Progressing  Goal: Absence of Hospital-Acquired Illness or Injury  Outcome: Ongoing, Progressing  Intervention: Identify and Manage Fall Risk  Recent Flowsheet Documentation  Taken 6/14/2024 0610 by Haydee Garcia RN  Safety Promotion/Fall Prevention: safety round/check completed  Taken 6/14/2024 0400 by Haydee Garcia RN  Safety Promotion/Fall Prevention: safety round/check completed  Taken 6/14/2024 0200 by Haydee Garcia RN  Safety Promotion/Fall Prevention: safety round/check completed  Taken 6/14/2024 0135 by Haydee Garcia RN  Safety Promotion/Fall Prevention: safety round/check completed  Taken 6/14/2024 0049 by Haydee Garcia RN  Safety Promotion/Fall Prevention: safety round/check completed  Taken 6/14/2024 0030 by Haydee Garcia RN  Safety Promotion/Fall Prevention: safety round/check completed  Taken 6/13/2024 2220 by Haydee Garcia RN  Safety Promotion/Fall Prevention: safety round/check completed  Taken 6/13/2024 2025 by Haydee Garcia RN  Safety Promotion/Fall Prevention: safety round/check completed  Intervention: Prevent Skin Injury  Recent Flowsheet Documentation  Taken 6/13/2024 2025 by Haydee Garcia RN  Body Position: position changed independently  Intervention: Prevent and Manage VTE (Venous Thromboembolism) Risk  Recent Flowsheet Documentation  Taken 6/14/2024 0610 by Haydee Garcia RN  Activity Management: ambulated to bathroom  Taken 6/13/2024 2025 by Haydee Garcia RN  Activity Management: ambulated to bathroom  Goal:  Optimal Comfort and Wellbeing  Outcome: Ongoing, Progressing  Intervention: Monitor Pain and Promote Comfort  Recent Flowsheet Documentation  Taken 2024 0135 by Haydee Garcia RN  Pain Management Interventions: see MAR  Intervention: Provide Person-Centered Care  Recent Flowsheet Documentation  Taken 2024 by Haydee Garcia RN  Trust Relationship/Rapport:   care explained   choices provided   thoughts/feelings acknowledged   reassurance provided   questions encouraged   questions answered  Goal: Readiness for Transition of Care  Outcome: Ongoing, Progressing     Problem:  Fall Injury Risk  Goal: Absence of Fall, Infant Drop and Related Injury  Outcome: Ongoing, Progressing  Intervention: Promote Injury-Free Environment  Recent Flowsheet Documentation  Taken 2024 0610 by Haydee Garcia RN  Safety Promotion/Fall Prevention: safety round/check completed  Taken 2024 0400 by Haydee Garcia RN  Safety Promotion/Fall Prevention: safety round/check completed  Taken 2024 0200 by Haydee Garcia RN  Safety Promotion/Fall Prevention: safety round/check completed  Taken 2024 0135 by Haydee Garcia RN  Safety Promotion/Fall Prevention: safety round/check completed  Taken 2024 0049 by Haydee Garcia RN  Safety Promotion/Fall Prevention: safety round/check completed  Taken 2024 0030 by Haydee Garcia RN  Safety Promotion/Fall Prevention: safety round/check completed  Taken 2024 2220 by Haydee Garcia RN  Safety Promotion/Fall Prevention: safety round/check completed  Taken 2024 by Haydee Garcia RN  Safety Promotion/Fall Prevention: safety round/check completed     Problem: Skin Injury Risk Increased  Goal: Skin Health and Integrity  Outcome: Ongoing, Progressing  Intervention: Optimize Skin Protection  Recent Flowsheet Documentation  Taken 2024 by Haydee Garcia RN  Head of Bed (HOB) Positioning: HOB elevated     Problem:  Adjustment to Role Transition (Postpartum Vaginal Delivery)  Goal: Successful Maternal Role Transition  Outcome: Ongoing, Progressing  Intervention: Support Maternal Role Transition  Recent Flowsheet Documentation  Taken 6/13/2024 2025 by Haydee Garcia RN  Parent/Child Attachment Promotion: caring behavior modeled     Problem: Bleeding (Postpartum Vaginal Delivery)  Goal: Hemostasis  Outcome: Ongoing, Progressing     Problem: Infection (Postpartum Vaginal Delivery)  Goal: Absence of Infection Signs/Symptoms  Outcome: Ongoing, Progressing  Intervention: Prevent or Manage Infection  Recent Flowsheet Documentation  Taken 6/14/2024 0610 by Haydee Garcia RN  Perineal Care:   absorbent brief/pad changed   perineum cleansed   perineal hygiene encouraged   perineal spray bottle/warm water use encouraged  Taken 6/13/2024 2025 by Haydee Garcia RN  Perineal Care:   absorbent brief/pad changed   cold pack/ice pack removed   cold pack/ice pack applied   perineal hygiene encouraged   perineal spray bottle/warm water use encouraged   perineum cleansed     Problem: Pain (Postpartum Vaginal Delivery)  Goal: Acceptable Pain Control  Outcome: Ongoing, Progressing  Intervention: Prevent or Manage Pain  Recent Flowsheet Documentation  Taken 6/14/2024 0135 by Haydee Garcia RN  Pain Management Interventions: see MAR     Problem: Urinary Retention (Postpartum Vaginal Delivery)  Goal: Effective Urinary Elimination  Outcome: Ongoing, Progressing   Goal Outcome Evaluation:  Plan of Care Reviewed With: patient, significant other           Outcome Evaluation: VSS, assessment WNL, up ad jocelin, pain controlled with po meds, d/c today.

## 2024-06-14 NOTE — LACTATION NOTE
Pt reports baby is BF well and her milk is coming in. She denies questions or needing assistance at this time. Educated on baby's expected output and weight gain. Pt has [facility] info for f/u    Lactation Consult Note    Evaluation Completed: 2024 08:58 EDT  Patient Name: Yumi Gardner  :  2000  MRN:  [id number]     REFERRAL  INFORMATION:                          Date of Referral: 24   Person Making Referral: patient  Maternal Reason for Referral: latch difficulty  Infant Reason for Referral: sleepy (high palate)    DELIVERY HISTORY:        Skin to skin initiation date/time: 2024  12:40 PM   Skin to skin end date/time: 2024  1:43 PM        MATERNAL ASSESSMENT:                               INFANT ASSESSMENT:  Information for the patient's :  Shaji Gardner [[id number]]   No past medical history on file.                                                                                                   MATERNAL INFANT FEEDING:                                                                       EQUIPMENT TYPE:                                 BREAST PUMPING:          LACTATION REFERRALS:

## 2024-06-14 NOTE — DISCHARGE SUMMARY
[facility]   Obstetrics and Gynecology   Vaginal delivery Discharge Summary      Date of Admission: 6/10/2024    Date of Discharge:        Patient: Yumi Gardner      MR#:[id number]    Surgeon/OB: Jyoti Knox     Discharge Diagnosis:   Vaginal Delivery at 38w3d, uncomplicated recovery    Pregnancy    Rubella non-immune status, antepartum    Maternal varicella, non-immune    Uterine size date discrepancy pregnancy    Gestational hypertension, third trimester    Vaginal delivery      Procedures:  Vaginal, Spontaneous     2024    12:38 PM      Anesthesia:  Epidural     Hospital Course  Patient is a 24 y.o. female  at 38w3d status post vaginal delivery.    Patient is ambulating, tolerating a regular diet.  Perineum is intact.  The patient has had some borderline blood pressures.  She will be discharged home on labetalol 100 mg twice daily.  She was instructed to check her blood pressure and to come back to the hospital for headache, visual changes or elevated blood pressure.  She has follow-up scheduled in the office with Dr. Knox in 4 days.  Patient needs her MMR prior to discharge.  I discussed this with the nurse.  Patient is agreeable.    Infant:   female  fetus 2950 g (6 lb 8.1 oz)  with Apgar scores of 8 , 9  at five minutes.    Condition on Discharge:  Stable    Vital Signs  Temp:  [97.6 °F (36.4 °C)-98.2 °F (36.8 °C)] 98.2 °F (36.8 °C)  Heart Rate:  [] 97  Resp:  [16] 16  BP: (115-145)/(73-86) 138/86    Results from last 7 days   Lab Units 24  0554 06/10/24  2112   WBC 10*3/mm3 13.50* 10.88*   HEMOGLOBIN g/dL 8.1* 10.1*   HEMATOCRIT % 25.5* 30.8*   PLATELETS 10*3/mm3 193 252     Results from last 7 days   Lab Units 06/10/24  2112   SODIUM mmol/L 136   POTASSIUM mmol/L 3.8   CHLORIDE mmol/L 104   CO2 mmol/L 19.7*   BUN mg/dL 5*   CREATININE mg/dL 0.59   CALCIUM mg/dL 8.5*   BILIRUBIN mg/dL <0.2   ALK PHOS U/L 172*   ALT (SGPT) U/L 10   AST (SGOT) U/L 12   GLUCOSE  mg/dL 109*       Discharge Disposition  Home or Self Care    Discharge Medications     Discharge Medications        New Medications        Instructions Start Date   ferrous sulfate 325 (65 FE) MG tablet   325 mg, Oral, Daily With Breakfast      ibuprofen 600 MG tablet  Commonly known as: ADVIL,MOTRIN   600 mg, Oral, Every 6 Hours PRN      labetalol 100 MG tablet  Commonly known as: NORMODYNE   100 mg, Oral, Every 12 Hours Scheduled      traMADol 50 MG tablet  Commonly known as: Ultram   50 mg, Oral, Every 6 Hours PRN             Continue These Medications        Instructions Start Date   Prenatal Vitamin/Min +DHA 27-0.8-200 MG capsule   1 tablet, Oral, Daily               Discharge Diet: Regular    Activity at Discharge:     Follow-up Appointments  Future Appointments   Date Time Provider Department Center   6/18/2024  9:45 AM Jyoti Knox MD [facility]       Follow-up for blood pressure check    Prenatal labs/vax:   Immunization History   Administered Date(s) Administered    DTaP, Unspecified 01/13/1999, 03/15/1999, 12/03/1999    Hep B / HiB 01/13/1999    Hepatitis A 05/10/2018    HiB 12/03/1999    Hib (PRP-OMP) 03/15/1999    IPV 01/13/1999    MMR 09/03/1999    OPV 09/03/1999    Tdap 07/29/2019    Varicella 09/03/1999       External Prenatal Results       Pregnancy Outside Results - Transcribed From Office Records - See Scanned Records For Details       Test Value Date Time    ABO  O  06/10/24 2232    Rh  Positive  06/10/24 2232    Antibody Screen  Negative  06/10/24 2232       Negative  11/16/23 1514    Varicella IgG  <135 index 11/16/23 1514    Rubella  <0.90 index 11/16/23 1514    Hgb  8.1 g/dL 06/13/24 0554       10.1 g/dL 06/10/24 2112       9.9 g/dL 05/21/24 1549       11.1 g/dL 04/01/24 1030       12.9 g/dL 11/16/23 1514    Hct  25.5 % 06/13/24 0554       30.8 % 06/10/24 2112       29.5 % 05/21/24 1549       34.1 % 04/01/24 1030       38.5 % 11/16/23 1514    HgB A1c   5.3 % 11/16/23 1514    1h  GTT  118 mg/dL 04/01/24 1030    3h GTT Fasting       3h GTT 1 hour       3h GTT 2 hour       3h GTT 3 hour        Gonorrhea (discrete)  Negative  05/30/24 1410       Negative  01/11/24 1141       Negative  11/16/23 1613       Negative  10/17/23 1513    Chlamydia (discrete)  Negative  05/30/24 1410       Negative  01/11/24 1141       Negative  11/16/23 1613       Negative  10/17/23 1513    RPR  Non Reactive  04/01/24 1030       Non Reactive  11/16/23 1514    Syphilis Antibody       HBsAg  Negative  11/16/23 1514    Herpes Simplex Virus PCR       Herpes Simplex VIrus Culture       HIV  Non Reactive  11/16/23 1514    Hep C RNA Quant PCR       Hep C Antibody  Non Reactive  11/16/23 1514    AFP       NIPT       Cystic Fibroisis        Group B Strep  Negative  05/30/24 1451    GBS Susceptibility to Clindamycin       GBS Susceptibility to Erythromycin       Fetal Fibronectin       Genetic Testing, Maternal Blood                 Drug Screening       Test Value Date Time    Urine Drug Screen       Amphetamine Screen       Barbiturate Screen       Benzodiazepine Screen       Methadone Screen       Phencyclidine Screen       Opiates Screen       THC Screen       Cocaine Screen       Propoxyphene Screen       Buprenorphine Screen       Methamphetamine Screen       Oxycodone Screen       Tricyclic Antidepressants Screen                 Legend    ^: Historical                              Raymond Ibarra MD  06/14/24  09:30 EDT

## 2024-06-18 ENCOUNTER — POSTPARTUM VISIT (OUTPATIENT)
Dept: OBSTETRICS AND GYNECOLOGY | Age: 24
End: 2024-06-18
Payer: COMMERCIAL

## 2024-06-18 VITALS
WEIGHT: 176 LBS | SYSTOLIC BLOOD PRESSURE: 112 MMHG | HEIGHT: 66 IN | DIASTOLIC BLOOD PRESSURE: 80 MMHG | BODY MASS INDEX: 28.28 KG/M2

## 2024-06-18 DIAGNOSIS — O13.3 GESTATIONAL HYPERTENSION, THIRD TRIMESTER: ICD-10-CM

## 2024-06-18 PROBLEM — Z28.39 MATERNAL VARICELLA, NON-IMMUNE: Status: RESOLVED | Noted: 2023-12-12 | Resolved: 2024-06-18

## 2024-06-18 PROBLEM — O09.899 MATERNAL VARICELLA, NON-IMMUNE: Status: RESOLVED | Noted: 2023-12-12 | Resolved: 2024-06-18

## 2024-06-18 PROBLEM — Z28.39 RUBELLA NON-IMMUNE STATUS, ANTEPARTUM: Status: RESOLVED | Noted: 2023-12-12 | Resolved: 2024-06-18

## 2024-06-18 PROBLEM — O09.899 RUBELLA NON-IMMUNE STATUS, ANTEPARTUM: Status: RESOLVED | Noted: 2023-12-12 | Resolved: 2024-06-18

## 2024-06-18 PROBLEM — O26.849 UTERINE SIZE DATE DISCREPANCY PREGNANCY: Status: RESOLVED | Noted: 2024-05-07 | Resolved: 2024-06-18

## 2024-06-18 PROBLEM — Z34.90 PREGNANCY: Status: RESOLVED | Noted: 2024-02-06 | Resolved: 2024-06-18

## 2024-06-18 PROCEDURE — 0503F POSTPARTUM CARE VISIT: CPT | Performed by: OBSTETRICS & GYNECOLOGY

## 2024-06-18 NOTE — PROGRESS NOTES
"Subjective   Yumi Gardner is a 24 y.o. female who presents for a postpartum visit. She is 6 days postpartum following a spontaneous vaginal delivery. I have fully reviewed the prenatal and intrapartum course. The delivery was at 38-3 gestational weeks. Outcome: spontaneous vaginal delivery. Anesthesia: epidural. Postpartum course has been c/b HTN . Baby's course has been uncomplicated. Baby is feeding by breast. Bleeding thin lochia. Bowel function is normal. Bladder function is normal. Patient is not sexually active. Contraception method is undecided  Postpartum depression screening: positive.    The following portions of the patient's history were reviewed and updated as appropriate: allergies, current medications, past family history, past medical history, past social history, past surgical history, and problem list.    Review of Systems  Pertinent items are noted in HPI.    Objective   /80   Ht 167.6 cm (66\")   Wt 79.8 kg (176 lb)   LMP 08/24/2023 (Approximate)   BMI 28.41 kg/m²    General:  alert, appears stated age, and cooperative    Vulva:  not evaluated   Vagina: not evaluated   Assessment & Plan   postpartum exam. Pap smear not done at today's visit.    1. Contraception:  Undecided  2. GHTN - stable on labetalol   3. Follow up in: 2 weeks or as needed.  Counseling was given to patient and   for the following topics: instructions for management, patient and family education, and impressions . Total time of the encounter was 20 minutes and 15 minutes was spent counseling.             "

## 2024-06-24 ENCOUNTER — HOSPITAL ENCOUNTER (OUTPATIENT)
Dept: LACTATION | Facility: HOSPITAL | Age: 24
Discharge: HOME | End: 2024-06-24
Payer: COMMERCIAL

## 2024-06-24 NOTE — LACTATION NOTE
Pt here today because she is wanting assistance latching baby. She has been BF since baby was born 12 days ago but has needed a nipple shield. She reports pumping 3 times a day and getting up to 4 oz each pumping. FOB gives baby one 2.5-3 oz bottle of mom's pumped milk once a day. Baby is gaining weight well and is back to birth weight. LC assisted pt with latching baby today. Pt was able to then latch baby herself. Baby BF well on right breast but was sleepy on left. She transferred 1.3 oz on left breast in 13 min and 0.5 oz on right breast in total on 11 min off and on. Encouraged pt to BF on demand, at least every 2-3 hours. Educated on importance of deep latching and ways to achieve it. Baby has pediatrician appointment in morning. Pt will f/u in hospitals on Friday.     24  Birth weight 6-8.1  Today's weight 6-9.8  After BF weight 6-11.6    Lactation Consult Note    Evaluation Completed: 2024 13:44 EDT  Patient Name: Yumi Gardner  :  2000  MRN:  [id number]     REFERRAL  INFORMATION:                          Date of Referral: 24   Person Making Referral: patient  Maternal Reason for Referral: breastfeeding currently         MATERNAL ASSESSMENT:     Breast Shape: round (24 1300)  Breast Density: full (24 1300)  Areola: elastic (24 1300)  Nipples: short, graspable (24 1300)                MATERNAL INFANT FEEDING:     Maternal Emotional State: relaxed, receptive (24 1300)  Infant Positioning: cross-cradle (24 1300)   Signs of Milk Transfer: audible swallow, breasts soften with feeding, deep jaw excursions noted (24 1300)              Milk Ejection Reflex: present (24 1300)           Latch Assistance: minimal assistance (24 1300)                           Feeding Readiness Cues: eager, rooting, quiet (24 1300)        Effective Latch During Feeding: yes (24 1300)        Prefeeding Weight (gm): 2998 g (105.8 oz) (24  )  Postfeeding Weight (gm): 3050 g (107.6 oz) (24)  Weight Gain/Loss (gm) : 52 g (1.8 oz) (24)      Latch: 2-->grasps breast, tongue down, lips flanged, rhythmic sucking (24)  Audible Swallowin-->spontaneous and intermittent (24 hrs old) (24)  Type of Nipple: 2-->everted (after stimulation) (24)  Comfort (Breast/Nipple): 2-->soft/nontender (24)  Hold (Positioning): 1-->minimal assist, teach one side, mother does other, staff holds (24)  Latch Score: 9 (24)      EQUIPMENT TYPE:                                 BREAST PUMPING:          LACTATION REFERRALS:

## 2024-06-28 ENCOUNTER — HOSPITAL ENCOUNTER (OUTPATIENT)
Dept: LACTATION | Facility: HOSPITAL | Age: 24
Discharge: HOME | End: 2024-06-28
Payer: COMMERCIAL

## 2024-06-28 NOTE — LACTATION NOTE
Lactation Consult Note  Mom is here today to wean baby off the NS, which she started using in the hospital due to shorter nipples.  Baby lavern Keith was born on 24 and weighed 6lb.8oz. Infant was 6lb.9.8oz here on Monday. Mom has been BF baby every 2-2.5 hours with the NS. On Monday she came here and baby was able to latch without the NS, but after she went home, was not able to latch baby without it and that's why she came back. Current weight is 6lb.12.9 oz. Baby gained 3.1oz in 4 days which is 0.75 oz per day. This is OK.When mom pumps she is getting 2-2.5 oz of EBM after BF     Breastfeeding: Assisted mom with latching infant on the left breast in a cross cradle hold without the NS and baby started BF immediately. She is latching well and has audible swallows. PT was amused that infant latched so easily and said she was leaning over towards baby instead of bringing her towards her. Chin tug also demonstrated to open baby's mouth wider. After feeding for 15 minutes she released the breast. Infant got 1 oz. Then was burped and transferred to the left breast, where she latched for 15 more minutes with constant stimulation(was falling asleep) and got 1oz. Baby had total of 2 oz.For her current weight she needs to get around 2 oz if she BF every 3h. Baby is getting enough. Practiced with mom latching baby on her own and infant latched each time without the NS.       Plan:   -continue BF baby every 2-3 h.or PRN without the NS  -F/U PRN    Birth weight: 6lb.8oz.  Current weight: 6lb.12.9oz.  Amount transferred: 2oz.in 30 min.          Evaluation Completed: 2024 16:17 EDT  Patient Name: Yumi Gardner  :  2000  MRN:  [id number]     REFERRAL  INFORMATION:                          Date of Referral: 24   Person Making Referral: patient  Maternal Reason for Referral: breastfeeding currently  Infant Reason for Referral: other (see comments) (to wean off the NS)      MATERNAL ASSESSMENT:  Breast  Size Issue: none (24)  Breast Shape: Bilateral:, round (24)  Breast Density: Bilateral:, full (24)  Areola: Bilateral:, elastic (24)  Nipples: Bilateral:, everted, graspable (24)                MATERNAL INFANT FEEDING:     Maternal Emotional State: receptive, relaxed (24)  Infant Positioning: cross-cradle (24)   Signs of Milk Transfer: audible swallow (24)  Pain with Feeding: no (24)           Milk Ejection Reflex: present (24)           Latch Assistance: minimal assistance (24)                           Feeding Readiness Cues: crying, eager (24)  Satiety Cues: calm after feeding (24)     Effective Latch During Feeding: yes (24)  Suck/Swallow/Breathing Coordination: present (24)  Skin-to-Skin Contact, Duration: 33 (24)  Prefeeding Weight (gm): 3088 g (108.9 oz) (24)  Postfeeding Weight (gm): 3142 g (110.8 oz) (24)  Weight Gain/Loss (gm) : 54 g (1.9 oz) (24)      Latch: 2-->grasps breast, tongue down, lips flanged, rhythmic sucking (24)  Audible Swallowin-->spontaneous and intermittent (24 hrs old) (24)  Type of Nipple: 2-->everted (after stimulation) (24)  Comfort (Breast/Nipple): 2-->soft/nontender (24)  Hold (Positioning): 1-->minimal assist, teach one side, mother does other, staff holds (24)  Latch Score: 9 (24)      EQUIPMENT TYPE:                                 BREAST PUMPING:          LACTATION REFERRALS:

## 2024-07-02 ENCOUNTER — POSTPARTUM VISIT (OUTPATIENT)
Dept: OBSTETRICS AND GYNECOLOGY | Age: 24
End: 2024-07-02
Payer: COMMERCIAL

## 2024-07-02 VITALS
SYSTOLIC BLOOD PRESSURE: 104 MMHG | BODY MASS INDEX: 27.8 KG/M2 | DIASTOLIC BLOOD PRESSURE: 74 MMHG | WEIGHT: 173 LBS | HEIGHT: 66 IN

## 2024-07-02 DIAGNOSIS — N64.4 NIPPLE PAIN: ICD-10-CM

## 2024-07-02 PROBLEM — O13.3 GESTATIONAL HYPERTENSION, THIRD TRIMESTER: Status: RESOLVED | Noted: 2024-06-11 | Resolved: 2024-07-02

## 2024-07-02 RX ORDER — AMOXICILLIN 250 MG
2 CAPSULE ORAL DAILY
Qty: 60 TABLET | Refills: 1 | Status: SHIPPED | OUTPATIENT
Start: 2024-07-02

## 2024-07-02 NOTE — PROGRESS NOTES
"Subjective   Yumi Gardner is a 24 y.o. female who presents for a postpartum visit. She is 2 weeks postpartum following a spontaneous vaginal delivery. I have fully reviewed the prenatal and intrapartum course. The delivery was at 38-3 gestational weeks. Outcome: spontaneous vaginal delivery. Anesthesia: epidural. Postpartum course has been c/b nipple soreness . Baby's course has been uncomplicated. Baby is feeding by breast. Bleeding thin lochia. Bowel function is abnormal: painful . Bladder function is normal. Patient is not sexually active. Contraception method is  undecided  . Postpartum depression screening: negative.    The following portions of the patient's history were reviewed and updated as appropriate: allergies, current medications, past family history, past medical history, past social history, past surgical history, and problem list.    Review of Systems  Pertinent items are noted in HPI.    Objective   /74   Ht 167.6 cm (66\")   Wt 78.5 kg (173 lb)   Breastfeeding Yes   BMI 27.92 kg/m²    General:  alert, appears stated age, and cooperative    Vulva:  not evaluated   Vagina: not evaluated   Assessment & Plan   postpartum exam. Pap smear not done at today's visit.    1. Contraception:  undecided  2. Constipation - senna - S   3. GHTN - resolved   4. Follow up in: 5 weeks or as needed.  Counseling was given to patient and   for the following topics: instructions for management, impressions, and importance of treatment compliance . Total time of the encounter was 20 minutes and 15 minutes was spent counseling.             "

## 2024-07-29 ENCOUNTER — TELEPHONE (OUTPATIENT)
Dept: OBSTETRICS AND GYNECOLOGY | Age: 24
End: 2024-07-29

## 2024-07-29 NOTE — TELEPHONE ENCOUNTER
Provider: DR TOLBERT    Caller: ERNESTO CABALLERO    Relationship to Patient: SELF    Phone Number: [phone]    Reason for Call: PT CALLED STATED DR TOLBERT GAVE HER STOOL SOFTENERS; PT STATED THEY ARE NOT WORKING; PT STATED SHE IS HAVING BLEEDING AND PAIN; PT STATED IT HURTS TO WALK.    PLEASE CALL PT TO ADVISE.

## 2024-08-12 ENCOUNTER — TELEPHONE (OUTPATIENT)
Dept: OBSTETRICS AND GYNECOLOGY | Age: 24
End: 2024-08-12

## 2024-08-12 NOTE — TELEPHONE ENCOUNTER
Caller: Yumi Gardner    Relationship to patient: Self    Best call back number: [phone]      Type of visit: POSTPARTUM 6 WK         If rescheduling, when is the original appointment: 8/13/24    Additional notes:PT NEEDS TO R/S TOMORROWS APPT DUE TO COVID - SHE WANTS TO SEE DR TOLBERT FOR THIS VISIT PLEASE CALL PT TO SCHEDULE

## 2024-09-03 ENCOUNTER — OFFICE VISIT (OUTPATIENT)
Dept: OBSTETRICS AND GYNECOLOGY | Age: 24
End: 2024-09-03
Payer: COMMERCIAL

## 2024-09-03 VITALS
DIASTOLIC BLOOD PRESSURE: 68 MMHG | BODY MASS INDEX: 27.16 KG/M2 | HEIGHT: 66 IN | WEIGHT: 169 LBS | SYSTOLIC BLOOD PRESSURE: 110 MMHG

## 2024-09-03 DIAGNOSIS — Z30.09 ENCOUNTER FOR OTHER GENERAL COUNSELING OR ADVICE ON CONTRACEPTION: ICD-10-CM

## 2024-09-03 DIAGNOSIS — K59.00 CONSTIPATION, UNSPECIFIED CONSTIPATION TYPE: ICD-10-CM

## 2024-09-03 PROBLEM — N64.4 NIPPLE PAIN: Status: RESOLVED | Noted: 2024-07-02 | Resolved: 2024-09-03

## 2024-09-03 PROCEDURE — 1160F RVW MEDS BY RX/DR IN RCRD: CPT | Performed by: OBSTETRICS & GYNECOLOGY

## 2024-09-03 PROCEDURE — 1159F MED LIST DOCD IN RCRD: CPT | Performed by: OBSTETRICS & GYNECOLOGY

## 2024-09-03 PROCEDURE — 99213 OFFICE O/P EST LOW 20 MIN: CPT | Performed by: OBSTETRICS & GYNECOLOGY

## 2024-09-03 RX ORDER — AMOXICILLIN 250 MG
2 CAPSULE ORAL DAILY
Qty: 60 TABLET | Refills: 1 | Status: SHIPPED | OUTPATIENT
Start: 2024-09-03

## 2024-09-03 NOTE — PROGRESS NOTES
"Subjective   Yumi Gardner is a 24 y.o. female presents 11 wk's 6 days today since vaginal del 6/12/24 baby girl Inna , breastfeeding , laceration , contraception none , last pap 10/17/23 neg , patient having painful bowel movement with noticing some blood in the stool , she would like another script for laxatives , OTC stuff not working well. She said when she ws taking the laxatives that you prescribe she had not as many problems .    History of Present Illness    The following portions of the patient's history were reviewed and updated as appropriate: allergies, current medications, past family history, past medical history, past social history, past surgical history, and problem list.    Review of Systems   Constitutional:  Negative for chills, fatigue and fever.   Gastrointestinal:  Positive for constipation.   Genitourinary:  Negative for dysuria, pelvic pain, vaginal bleeding, vaginal discharge and vaginal pain.   All other systems reviewed and are negative.    /68   Ht 167.6 cm (66\")   Wt 76.7 kg (169 lb)   Breastfeeding Yes   BMI 27.28 kg/m²     Objective   Physical Exam  Vitals and nursing note reviewed.   Constitutional:       Appearance: Normal appearance. She is normal weight.   Pulmonary:      Effort: Pulmonary effort is normal.   Genitourinary:     General: Normal vulva.      Exam position: Lithotomy position.      Labia:         Right: No rash or lesion.         Left: No rash or lesion.    Neurological:      Mental Status: She is alert and oriented to person, place, and time.   Psychiatric:         Mood and Affect: Mood normal.         Behavior: Behavior normal.         Assessment & Plan   Diagnoses and all orders for this visit:    1. Postpartum care following vaginal delivery (Primary)    2. Encounter for other general counseling or advice on contraception    3. Constipation, unspecified constipation type    Other orders  -     sennosides-docusate (senna-docusate sodium) 8.6-50 MG " per tablet; Take 2 tablets by mouth Daily.  Dispense: 60 tablet; Refill: 1       Counseling was given to patient for the following topics: instructions for management, impressions, risks and benefits of treatment options, and importance of treatment compliance . Total time of the encounter was 20 minutes and 15 minutes was spent counseling.    Return in about 7 months (around 4/3/2025) for Annual physical.

## 2024-09-16 ENCOUNTER — OFFICE VISIT (OUTPATIENT)
Dept: FAMILY MEDICINE CLINIC | Facility: CLINIC | Age: 24
End: 2024-09-16
Payer: COMMERCIAL

## 2024-09-16 VITALS
HEART RATE: 89 BPM | BODY MASS INDEX: 26.55 KG/M2 | OXYGEN SATURATION: 98 % | WEIGHT: 165.19 LBS | HEIGHT: 66 IN | DIASTOLIC BLOOD PRESSURE: 76 MMHG | SYSTOLIC BLOOD PRESSURE: 104 MMHG

## 2024-09-16 DIAGNOSIS — M79.674 GREAT TOE PAIN, RIGHT: Primary | ICD-10-CM

## 2024-09-16 PROCEDURE — 99213 OFFICE O/P EST LOW 20 MIN: CPT | Performed by: NURSE PRACTITIONER

## 2024-09-16 PROCEDURE — 1160F RVW MEDS BY RX/DR IN RCRD: CPT | Performed by: NURSE PRACTITIONER

## 2024-09-16 PROCEDURE — 1159F MED LIST DOCD IN RCRD: CPT | Performed by: NURSE PRACTITIONER

## 2024-09-16 RX ORDER — CEPHALEXIN 500 MG/1
500 CAPSULE ORAL 2 TIMES DAILY
Qty: 20 CAPSULE | Refills: 0 | Status: SHIPPED | OUTPATIENT
Start: 2024-09-16

## 2024-10-03 ENCOUNTER — TELEPHONE (OUTPATIENT)
Dept: OBSTETRICS AND GYNECOLOGY | Age: 24
End: 2024-10-03
Payer: COMMERCIAL

## 2024-10-03 DIAGNOSIS — K92.1 BLOOD IN STOOL: ICD-10-CM

## 2024-10-03 DIAGNOSIS — K59.00 CONSTIPATION, UNSPECIFIED CONSTIPATION TYPE: ICD-10-CM

## 2024-10-03 DIAGNOSIS — Z87.59 HISTORY OF FOURTH DEGREE PERINEAL LACERATION: Primary | ICD-10-CM

## 2024-10-03 NOTE — TELEPHONE ENCOUNTER
Patient was told to call back with any worsening symptoms of painful bowel movements.She has been using the senna-docusate sodium and states it is not helping.She is ripped in the rectal area and bleeding. mentioned to her at her last appt.about possibly seeing a colorectal specialist. Not sure if she needed to be seen here first. Please advise.

## 2024-10-22 ENCOUNTER — OFFICE VISIT (OUTPATIENT)
Dept: SURGERY | Facility: CLINIC | Age: 24
End: 2024-10-22
Payer: COMMERCIAL

## 2024-10-22 VITALS
WEIGHT: 167.1 LBS | OXYGEN SATURATION: 97 % | SYSTOLIC BLOOD PRESSURE: 110 MMHG | HEART RATE: 96 BPM | DIASTOLIC BLOOD PRESSURE: 68 MMHG | BODY MASS INDEX: 26.23 KG/M2 | HEIGHT: 67 IN

## 2024-10-22 DIAGNOSIS — K60.2 ANAL FISSURE: Primary | ICD-10-CM

## 2024-10-22 PROCEDURE — 1160F RVW MEDS BY RX/DR IN RCRD: CPT | Performed by: PHYSICIAN ASSISTANT

## 2024-10-22 PROCEDURE — 1159F MED LIST DOCD IN RCRD: CPT | Performed by: PHYSICIAN ASSISTANT

## 2024-10-22 PROCEDURE — 99213 OFFICE O/P EST LOW 20 MIN: CPT | Performed by: PHYSICIAN ASSISTANT

## 2024-10-22 RX ORDER — AMOXICILLIN 250 MG
2 CAPSULE ORAL DAILY
Qty: 60 TABLET | Refills: 1 | Status: SHIPPED | OUTPATIENT
Start: 2024-10-22

## 2024-10-22 NOTE — PROGRESS NOTES
History of Present Illness  The patient presents for evaluation of an anal fissure.    She reports experiencing bleeding during bowel movements since the birth of her daughter four months ago. Despite her gynecologist's assurance that hemorrhoids were not visualized on exam, she continues to experience pain and bleeding even after taking stool softeners. The discomfort is so severe at times that it hinders her ability to walk.    She has been taking the stool softener one to two times daily and has a bowel movement at least once a day. She has not used any creams or ointments for relief, other than Tuck's pads (she is unsure whether these have helped at all). She has no family history of colon cancer or polyps and has never undergone a colonoscopy. She has tried prescribed senna and docusate, which help somewhat but have not led to resolution of symptoms. She is requesting a refill of this medications.       Past Medical History:   Diagnosis Date    Anxiety 2016    Gestational hypertension     Healthy adult on routine physical examination     Postpartum depression 6/18/2024    Pregnancy 02/06/2024       Past Surgical History:   Procedure Laterality Date    TONSILLECTOMY      [place] TOOTH EXTRACTION Bilateral 2020       Social History:   reports that she has never smoked. She has never been exposed to tobacco smoke. She has never used smokeless tobacco. She reports that she does not drink alcohol and does not use drugs.      Marriage status: Single    Family History   Problem Relation Age of Onset    No Known Problems Father     No Known Problems Mother     Alzheimer's disease Paternal Grandmother     Thyroid cancer Maternal Grandfather     Hearing loss Cousin     Hearing loss Cousin     Breast cancer Neg Hx     Ovarian cancer Neg Hx     Uterine cancer Neg Hx     Colon cancer Neg Hx          Current Outpatient Medications:     sennosides-docusate (senna-docusate sodium) 8.6-50 MG per tablet, Take 2 tablets by mouth  Daily., Disp: 60 tablet, Rfl: 1    Allergy  Adhesive tape    Vitals:    10/22/24 1245   BP: 110/68   Pulse: 96   SpO2: 97%   -  Body mass index is 26.17 kg/m².    Physical Exam  No acute distress  Chaperone present  Perianal exam: superficial anterior and posterior midline anal fissures.       Assessment & Plan  1. Anal fissure.  Symptoms and physical examination findings are consistent with an anal fissure. She is advised to continue with stool softeners and start a fiber supplement, beginning with one dose per day for the first week. If tolerated well, the dose can be increased to twice daily. Detailed written instructions were provided. A prescription for a compound cream containing lidocaine, diltiazem, and baclofen will be sent to a mail order pharmacy. She is instructed to apply a pea-sized amount of the cream to the anal opening three times a day, continuing for an additional 1 to 2 weeks after symptom resolution to ensure complete healing. The stool softeners senna and docusate will be sent to the [facility] pharmacy in [place].    Follow-up  Return in 6 weeks for follow up.       Patient or patient representative verbalized consent for the use of Ambient Listening during the visit with  Malinda Martini PA-C for chart documentation. 10/22/2024  13:09 EDT    Malinda Martini PA-C  Physician Assistant  Colorectal Surgery

## 2024-10-25 ENCOUNTER — PATIENT ROUNDING (BHMG ONLY) (OUTPATIENT)
Dept: SURGERY | Facility: CLINIC | Age: 24
End: 2024-10-25
Payer: COMMERCIAL

## 2024-10-25 NOTE — PROGRESS NOTES
October 25, 2024      I am calling to officially welcome you to our practice and ask about your recent visit. Is this a good time to talk? No. Left voicemail to call back.

## 2024-11-21 ENCOUNTER — OFFICE VISIT (OUTPATIENT)
Dept: FAMILY MEDICINE CLINIC | Facility: CLINIC | Age: 24
End: 2024-11-21
Payer: COMMERCIAL

## 2024-11-21 ENCOUNTER — TELEPHONE (OUTPATIENT)
Dept: SURGERY | Facility: CLINIC | Age: 24
End: 2024-11-21
Payer: COMMERCIAL

## 2024-11-21 VITALS
DIASTOLIC BLOOD PRESSURE: 84 MMHG | WEIGHT: 162.5 LBS | SYSTOLIC BLOOD PRESSURE: 130 MMHG | HEART RATE: 76 BPM | TEMPERATURE: 97.5 F | BODY MASS INDEX: 25.51 KG/M2 | HEIGHT: 67 IN | OXYGEN SATURATION: 98 %

## 2024-11-21 DIAGNOSIS — K60.2 ANAL FISSURE: Primary | ICD-10-CM

## 2024-11-21 PROCEDURE — 1160F RVW MEDS BY RX/DR IN RCRD: CPT | Performed by: NURSE PRACTITIONER

## 2024-11-21 PROCEDURE — 1125F AMNT PAIN NOTED PAIN PRSNT: CPT | Performed by: NURSE PRACTITIONER

## 2024-11-21 PROCEDURE — 99213 OFFICE O/P EST LOW 20 MIN: CPT | Performed by: NURSE PRACTITIONER

## 2024-11-21 PROCEDURE — 1159F MED LIST DOCD IN RCRD: CPT | Performed by: NURSE PRACTITIONER

## 2024-11-21 NOTE — TELEPHONE ENCOUNTER
Provider- [facility] office called to see if we could see the pt in office today for rectal pain. Attempted to call pt for more information. LVM.

## 2024-11-21 NOTE — TELEPHONE ENCOUNTER
Spoke with Malinda in office. PCP sent a secure chat on pt and advised to eval her in their clinic and determine if pt needed to proceed to ER as our schedule is full.

## 2024-11-21 NOTE — ASSESSMENT & PLAN NOTE
Encouraged her to continue stay hydrated with water.  She can continue with stool softeners fiber supplement her compounded cream and sitz bath's.  I am going to put in a new referral to GI to see if someone can get her in quickly to evaluate her further per her request.  We were able to get her scheduled with GI Leola morbidly tomorrow at [facility] at 1:00 and patient will go to that appointment.  We discussed possibility of abscess and I informed her if anything worsens or if pain worsens or if bleeding increases or occurs again I would recommend she go to the emergency department to be evaluated and she understands.  Return to clinic or ED with any issues or concerns.

## 2024-11-21 NOTE — PROGRESS NOTES
"Chief Complaint  rectum pain    Subjective          Yumi Gardner presents to [facility]  History of Present Illness    Patient has history of anal fissures.  States she has been taking her stool softener and using the compounded cream as prescribed by Trish Martini and states she continues to have the rectal pain.  States yesterday she had a bowel movement and then noticed blood on the toilet paper and has had pain in her rectum since then.  Currently she rates the pain a 5 on a scale of 1-10.  She states she contacted her current colon specialist and they would not see her without a new referral per patient report.  I reached out to Trish Martini and she recommended she continue with her stool softeners continue with fiber continue with the compounded cream and use sitz bath's as needed.  Patient states she has been doing this and it does not seem to be helping much so I am going to see if we can get her in with a new gastroenterologist quickly to evaluate her further.  She states she knows there is not much that we can do here but states she was told she needed a new referral.  History of Present Illness      Objective   Vital Signs:   /84   Pulse 76   Temp 97.5 °F (36.4 °C)   Ht 170.2 cm (67\")   Wt 73.7 kg (162 lb 8 oz)   SpO2 98%   BMI 25.45 kg/m²     Body mass index is 25.45 kg/m².    Review of Systems   Constitutional:  Negative for chills, fatigue and fever.   HENT:  Negative for congestion.    Respiratory:  Negative for cough, shortness of breath and wheezing.    Cardiovascular:  Negative for chest pain.   Gastrointestinal:  Positive for anal bleeding and rectal pain. Negative for abdominal distention, abdominal pain, constipation, diarrhea, nausea and vomiting.   Genitourinary:  Negative for dysuria.   Musculoskeletal:  Negative for back pain.   Neurological:  Negative for headache.       Past History:  Medical History: has a past medical history of Anxiety " (2016), Gestational hypertension, Healthy adult on routine physical examination, Postpartum depression (6/18/2024), and Pregnancy (02/06/2024).   Surgical History: has a past surgical history that includes [place] tooth extraction (Bilateral, 2020) and Tonsillectomy.   Family History: family history includes Alzheimer's disease in her paternal grandmother; Hearing loss in her cousin and cousin; No Known Problems in her father and mother; Thyroid cancer in her maternal grandfather.   Social History: reports that she has never smoked. She has never been exposed to tobacco smoke. She has never used smokeless tobacco. She reports that she does not drink alcohol and does not use drugs.    PHQ-2 Depression Screening  Little interest or pleasure in doing things?     Feeling down, depressed, or hopeless?     PHQ-2 Total Score         PHQ-9 Depression Screening  Little interest or pleasure in doing things?     Feeling down, depressed, or hopeless?     PHQ-2 Total Score     Trouble falling or staying asleep, or sleeping too much?     Feeling tired or having little energy?     Poor appetite or overeating?     Feeling bad about yourself - or that you are a failure or have let yourself or your family down?     Trouble concentrating on things, such as reading the newspaper or watching television?     Moving or speaking so slowly that other people could have noticed? Or the opposite - being so fidgety or restless that you have been moving around a lot more than usual?     Thoughts that you would be better off dead, or of hurting yourself in some way?     PHQ-9 Total Score     If you checked off any problems, how difficult have these problems made it for you to do your work, take care of things at home, or get along with other people?          PHQ-9 Total Score:        Patient screened positive for depression based on a PHQ-9 score of  on . Follow-up recommendations include:          Current Outpatient Medications:      sennosides-docusate (senna-docusate sodium) 8.6-50 MG per tablet, Take 2 tablets by mouth Daily., Disp: 60 tablet, Rfl: 1   (Not in a hospital admission)     Allergies: Adhesive tape    Physical Exam  Constitutional:       Appearance: Normal appearance.   Cardiovascular:      Rate and Rhythm: Normal rate and regular rhythm.      Heart sounds: Normal heart sounds.   Pulmonary:      Effort: Pulmonary effort is normal.      Breath sounds: Normal breath sounds.   Abdominal:      General: Abdomen is flat. Bowel sounds are normal. There is no distension.      Palpations: Abdomen is soft.      Tenderness: There is no abdominal tenderness. There is no guarding or rebound.   Genitourinary:     Comments: Denied  Neurological:      General: No focal deficit present.      Mental Status: She is alert and oriented to person, place, and time. Mental status is at baseline.   Psychiatric:         Mood and Affect: Mood normal.         Behavior: Behavior normal.         Thought Content: Thought content normal.         Judgment: Judgment normal.        Physical Exam      Result Review :          Results               Assessment and Plan    Diagnoses and all orders for this visit:    1. Anal fissure (Primary)  Assessment & Plan:  Encouraged her to continue stay hydrated with water.  She can continue with stool softeners fiber supplement her compounded cream and sitz bath's.  I am going to put in a new referral to GI to see if someone can get her in quickly to evaluate her further per her request.  We were able to get her scheduled with GI Leola morbidly tomorrow at [facility] at 1:00 and patient will go to that appointment.  We discussed possibility of abscess and I informed her if anything worsens or if pain worsens or if bleeding increases or occurs again I would recommend she go to the emergency department to be evaluated and she understands.  Return to clinic or ED with any issues or concerns.    Orders:  -      Ambulatory Referral to Gastroenterology      Assessment & Plan                      Follow Up   Return if symptoms worsen or fail to improve.  Patient was given instructions and counseling regarding her condition or for health maintenance advice. Please see specific info    TEDDY Brody

## 2024-12-03 ENCOUNTER — OFFICE VISIT (OUTPATIENT)
Dept: SURGERY | Facility: CLINIC | Age: 24
End: 2024-12-03
Payer: COMMERCIAL

## 2024-12-03 VITALS
WEIGHT: 161.8 LBS | DIASTOLIC BLOOD PRESSURE: 74 MMHG | BODY MASS INDEX: 25.39 KG/M2 | OXYGEN SATURATION: 97 % | SYSTOLIC BLOOD PRESSURE: 100 MMHG | HEIGHT: 67 IN | HEART RATE: 96 BPM

## 2024-12-03 DIAGNOSIS — K60.2 ANAL FISSURE: Primary | ICD-10-CM

## 2024-12-03 PROCEDURE — 99213 OFFICE O/P EST LOW 20 MIN: CPT | Performed by: PHYSICIAN ASSISTANT

## 2024-12-03 PROCEDURE — 1159F MED LIST DOCD IN RCRD: CPT | Performed by: PHYSICIAN ASSISTANT

## 2024-12-03 PROCEDURE — 1160F RVW MEDS BY RX/DR IN RCRD: CPT | Performed by: PHYSICIAN ASSISTANT

## 2024-12-03 NOTE — PROGRESS NOTES
"History of Present Illness  The patient is a 24-year-old female who presents for follow-up evaluation of anal fissure.    She reports a significant improvement in her condition, with the anal fissure now healed. She had a BM this morning with no pain or bleeding. She has been applying the prescribed cream three times daily. However, she mentions that the cream  on 2024 and she is uncertain about how to obtain a new supply.    /74 (BP Location: Left arm, Patient Position: Sitting, Cuff Size: Adult)   Pulse 96   Ht 170.2 cm (67\")   Wt 73.4 kg (161 lb 12.8 oz)   LMP  (LMP Unknown)   SpO2 97%   BMI 25.34 kg/m²   Body mass index is 25.34 kg/m².  -  Physical Exam  No acute distress     Assessment & Plan  1. Anal fissure.  She reports significant improvement and feels that the fissure has healed. She should continue using the cream three times a day for a few more weeks to ensure complete healing. If the cream has , she can contact the pharmacy for a refill as there are refills available. She is advised to maintain regular bowel movements to prevent recurrence. If symptoms recur, she should return for further evaluation.     Patient or patient representative verbalized consent for the use of Ambient Listening during the visit with  Malinda Martini PA-C for chart documentation. 12/3/2024  11:03 ANDERSON Martini PA-C  Physician Assistant  Colorectal Surgery    "

## 2025-01-13 ENCOUNTER — APPOINTMENT (OUTPATIENT)
Dept: ULTRASOUND IMAGING | Facility: HOSPITAL | Age: 25
End: 2025-01-13
Payer: COMMERCIAL

## 2025-01-13 ENCOUNTER — TELEPHONE (OUTPATIENT)
Dept: OBSTETRICS AND GYNECOLOGY | Age: 25
End: 2025-01-13
Payer: COMMERCIAL

## 2025-01-13 ENCOUNTER — HOSPITAL ENCOUNTER (EMERGENCY)
Facility: HOSPITAL | Age: 25
Discharge: HOME | End: 2025-01-14
Attending: EMERGENCY MEDICINE | Admitting: EMERGENCY MEDICINE
Payer: COMMERCIAL

## 2025-01-13 DIAGNOSIS — O46.8X1 SUBCHORIONIC HEMATOMA IN FIRST TRIMESTER, SINGLE OR UNSPECIFIED FETUS: ICD-10-CM

## 2025-01-13 DIAGNOSIS — O41.8X10 SUBCHORIONIC HEMATOMA IN FIRST TRIMESTER, SINGLE OR UNSPECIFIED FETUS: ICD-10-CM

## 2025-01-13 DIAGNOSIS — O20.0 THREATENED MISCARRIAGE: Primary | ICD-10-CM

## 2025-01-13 LAB
ABO GROUP BLD: NORMAL
BASOPHILS # BLD AUTO: 0.08 10*3/MM3 (ref 0–0.2)
BASOPHILS NFR BLD AUTO: 0.9 % (ref 0–1.5)
BLD GP AB SCN SERPL QL: NEGATIVE
DEPRECATED RDW RBC AUTO: 46 FL (ref 37–54)
EOSINOPHIL # BLD AUTO: 0.3 10*3/MM3 (ref 0–0.4)
EOSINOPHIL NFR BLD AUTO: 3.3 % (ref 0.3–6.2)
ERYTHROCYTE [DISTWIDTH] IN BLOOD BY AUTOMATED COUNT: 15.3 % (ref 12.3–15.4)
HCG INTACT+B SERPL-ACNC: NORMAL MIU/ML
HCT VFR BLD AUTO: 38 % (ref 34–46.6)
HGB BLD-MCNC: 12.7 G/DL (ref 12–15.9)
HOLD SPECIMEN: NORMAL
HOLD SPECIMEN: NORMAL
IMM GRANULOCYTES # BLD AUTO: 0.03 10*3/MM3 (ref 0–0.05)
IMM GRANULOCYTES NFR BLD AUTO: 0.3 % (ref 0–0.5)
LYMPHOCYTES # BLD AUTO: 2.85 10*3/MM3 (ref 0.7–3.1)
LYMPHOCYTES NFR BLD AUTO: 31.1 % (ref 19.6–45.3)
MCH RBC QN AUTO: 27.9 PG (ref 26.6–33)
MCHC RBC AUTO-ENTMCNC: 33.4 G/DL (ref 31.5–35.7)
MCV RBC AUTO: 83.5 FL (ref 79–97)
MONOCYTES # BLD AUTO: 0.76 10*3/MM3 (ref 0.1–0.9)
MONOCYTES NFR BLD AUTO: 8.3 % (ref 5–12)
NEUTROPHILS NFR BLD AUTO: 5.13 10*3/MM3 (ref 1.7–7)
NEUTROPHILS NFR BLD AUTO: 56.1 % (ref 42.7–76)
NRBC BLD AUTO-RTO: 0 /100 WBC (ref 0–0.2)
NUMBER OF DOSES: NORMAL
PLATELET # BLD AUTO: 325 10*3/MM3 (ref 140–450)
PMV BLD AUTO: 10.1 FL (ref 6–12)
RBC # BLD AUTO: 4.55 10*6/MM3 (ref 3.77–5.28)
RH BLD: POSITIVE
WBC NRBC COR # BLD AUTO: 9.15 10*3/MM3 (ref 3.4–10.8)
WHOLE BLOOD HOLD COAG: NORMAL
WHOLE BLOOD HOLD SPECIMEN: NORMAL

## 2025-01-13 PROCEDURE — 36415 COLL VENOUS BLD VENIPUNCTURE: CPT

## 2025-01-13 PROCEDURE — 86900 BLOOD TYPING SEROLOGIC ABO: CPT

## 2025-01-13 PROCEDURE — 86850 RBC ANTIBODY SCREEN: CPT

## 2025-01-13 PROCEDURE — 85025 COMPLETE CBC W/AUTO DIFF WBC: CPT

## 2025-01-13 PROCEDURE — 76817 TRANSVAGINAL US OBSTETRIC: CPT

## 2025-01-13 PROCEDURE — 86901 BLOOD TYPING SEROLOGIC RH(D): CPT

## 2025-01-13 PROCEDURE — 99284 EMERGENCY DEPT VISIT MOD MDM: CPT

## 2025-01-13 PROCEDURE — 84702 CHORIONIC GONADOTROPIN TEST: CPT

## 2025-01-13 PROCEDURE — 76815 OB US LIMITED FETUS(S): CPT

## 2025-01-13 RX ORDER — SODIUM CHLORIDE 0.9 % (FLUSH) 0.9 %
10 SYRINGE (ML) INJECTION AS NEEDED
Status: DISCONTINUED | OUTPATIENT
Start: 2025-01-13 | End: 2025-01-14 | Stop reason: HOSPADM

## 2025-01-13 NOTE — TELEPHONE ENCOUNTER
Pt calling with bright red spotting, [place] pt,confirmation scheduled 1/21, no appts in [place] today, offered appt at 2:30 and 3 today with PA and pt not able to do that,you have an appt in [place] tomorrow but no us

## 2025-01-14 ENCOUNTER — OFFICE VISIT (OUTPATIENT)
Dept: OBSTETRICS AND GYNECOLOGY | Age: 25
End: 2025-01-14
Payer: COMMERCIAL

## 2025-01-14 VITALS
BODY MASS INDEX: 25.74 KG/M2 | DIASTOLIC BLOOD PRESSURE: 80 MMHG | SYSTOLIC BLOOD PRESSURE: 112 MMHG | WEIGHT: 164 LBS | HEIGHT: 67 IN

## 2025-01-14 VITALS
SYSTOLIC BLOOD PRESSURE: 118 MMHG | OXYGEN SATURATION: 100 % | DIASTOLIC BLOOD PRESSURE: 85 MMHG | TEMPERATURE: 97.6 F | RESPIRATION RATE: 16 BRPM | HEART RATE: 75 BPM

## 2025-01-14 DIAGNOSIS — O20.8 SUBCHORIONIC HEMORRHAGE OF PLACENTA IN FIRST TRIMESTER: ICD-10-CM

## 2025-01-14 DIAGNOSIS — Z32.00 ENCOUNTER FOR CONFIRMATION OF PREGNANCY TEST RESULT WITH PHYSICAL EXAMINATION: Primary | ICD-10-CM

## 2025-01-14 DIAGNOSIS — Z13.89 SCREENING FOR BLOOD OR PROTEIN IN URINE: ICD-10-CM

## 2025-01-14 DIAGNOSIS — O20.9 BLEEDING IN EARLY PREGNANCY: ICD-10-CM

## 2025-01-14 PROBLEM — O46.8X9 SUBCHORIONIC HEMORRHAGE: Status: ACTIVE | Noted: 2025-01-14

## 2025-01-14 LAB
BILIRUB BLD-MCNC: NEGATIVE MG/DL
GLUCOSE UR STRIP-MCNC: NEGATIVE MG/DL
KETONES UR QL: NEGATIVE
LEUKOCYTE EST, POC: ABNORMAL
NITRITE UR-MCNC: NEGATIVE MG/ML
PH UR: 7 [PH] (ref 5–8)
PROT UR STRIP-MCNC: NEGATIVE MG/DL
RBC # UR STRIP: ABNORMAL /UL
SP GR UR: 1.02 (ref 1–1.03)
UROBILINOGEN UR QL: NORMAL

## 2025-01-14 NOTE — PROGRESS NOTES
"Jadiel Gardner is a 24 y.o. female presents for pregnancy confirmation with U/S -LMP 10/27/24 , patient started bleeding last night went to ER , continuing bleeding today period flow with some cramping .  Last night and today on ultrasound there is a crown-rump length measuring 6 weeks and 1 day with no cardiac activity.  As this is the patient's first ultrasound I have no comparison.  I discussed with patient and  that this could be a missed AB that has been present for several weeks or it could be an early IUP with no cardiac activity yet.  Bleeding and fever warnings given to patient.  Will return in 1 week for ultrasound.    History of Present Illness    The following portions of the patient's history were reviewed and updated as appropriate: allergies, current medications, past family history, past medical history, past social history, past surgical history, and problem list.    Review of Systems   Constitutional:  Negative for chills, fatigue and fever.   Gastrointestinal:  Negative for abdominal distention and abdominal pain.   Genitourinary:  Positive for vaginal bleeding. Negative for dysuria, pelvic pain, vaginal discharge and vaginal pain.   All other systems reviewed and are negative.    /80   Ht 170.2 cm (67\")   Wt 74.4 kg (164 lb)   LMP 10/27/2024 (Approximate)   BMI 25.69 kg/m²     Objective   Physical Exam  Vitals and nursing note reviewed.   Constitutional:       Appearance: Normal appearance. She is normal weight.   Neurological:      Mental Status: She is alert and oriented to person, place, and time.   Psychiatric:         Mood and Affect: Mood normal.         Behavior: Behavior normal.           Assessment & Plan   Diagnoses and all orders for this visit:    1. Encounter for confirmation of pregnancy test result with physical examination (Primary)    2. Screening for blood or protein in urine  -     POC Urinalysis Dipstick    3. Bleeding in early pregnancy    4. " Subchorionic hemorrhage of placenta in first trimester      Counseling was given to patient and   for the following topics: diagnostic results, instructions for management, impressions, and importance of treatment compliance . Total time of the encounter was 30 minutes and 25 minutes was spent counseling.

## 2025-01-14 NOTE — ED PROVIDER NOTES
EMERGENCY DEPARTMENT ENCOUNTER    Room Number:  13/13  PCP: Sandro Harden APRN  Patient Care Team:  Sandro Harden APRN as PCP - General (Nurse Practitioner)  Malinda Martini PA-C as Physician Assistant (Physician Assistant)   Independent Historians: Patient    HPI:  Chief Complaint: Vaginal bleeding    A complete HPI/ROS/PMH/PSH/SH/FH are unobtainable due to: None    Chronic or social conditions impacting patient care (Social Determinants of Health): None  (Financial Resource Strain / Food Insecurity / Transportation Needs / Physical Activity / Stress / Social Connections / Intimate Partner Violence / Housing Stability)    Context: Yumi Gardner is a 24 y.o. female who presents to the ED c/o acute G2, P1 at approximately 5 to 6 weeks presents with vaginal bleeding.  Patient has a lot of spotting but has increased to the volume she would associate with a normal period.  Patient denies any lower abdominal pain no cramping no nausea vomiting fever chills.    Review of prior external notes (non-ED) -and- Review of prior external test results outside of this encounter: Patient's discharge after vaginal livery 6/10/2024    Prescription drug monitoring program review:         PAST MEDICAL HISTORY  Active Ambulatory Problems     Diagnosis Date Noted    Postpartum care following vaginal delivery 09/03/2024    Great toe pain, right 09/16/2024    Anal fissure 11/21/2024     Resolved Ambulatory Problems     Diagnosis Date Noted    Missed menses 10/17/2023    Positive urine pregnancy test 10/17/2023    Rubella non-immune status, antepartum 12/12/2023    Maternal varicella, non-immune 12/12/2023    Pregnancy 02/06/2024    Incomplete fetal anatomy 02/06/2024    Uterine size date discrepancy pregnancy 05/07/2024    Gestational hypertension, third trimester 06/11/2024    Vaginal delivery 06/12/2024    Postpartum depression 06/18/2024    Nipple pain 07/02/2024     Past Medical History:   Diagnosis Date    Anxiety  2016    Gestational hypertension     Healthy adult on routine physical examination          PAST SURGICAL HISTORY  Past Surgical History:   Procedure Laterality Date    TONSILLECTOMY      WISDOM TOOTH EXTRACTION Bilateral 2020         FAMILY HISTORY  Family History   Problem Relation Age of Onset    No Known Problems Father     No Known Problems Mother     Alzheimer's disease Paternal Grandmother     Thyroid cancer Maternal Grandfather     Hearing loss Cousin     Hearing loss Cousin     Breast cancer Neg Hx     Ovarian cancer Neg Hx     Uterine cancer Neg Hx     Colon cancer Neg Hx          SOCIAL HISTORY  Social History     Socioeconomic History    Marital status: Single    Number of children: 0   Tobacco Use    Smoking status: Never     Passive exposure: Never    Smokeless tobacco: Never   Vaping Use    Vaping status: Never Used   Substance and Sexual Activity    Alcohol use: No    Drug use: No    Sexual activity: Yes     Partners: Male     Birth control/protection: None         ALLERGIES  Adhesive tape        REVIEW OF SYSTEMS  Review of Systems  Included in HPI  All systems reviewed and negative except for those discussed in HPI.      PHYSICAL EXAM    I have reviewed the triage vital signs and nursing notes.    ED Triage Vitals   Temp Heart Rate Resp BP SpO2   01/13/25 1914 01/13/25 1914 01/13/25 1914 01/13/25 1924 01/13/25 1914   97.2 °F (36.2 °C) 84 16 126/88 100 %      Temp src Heart Rate Source Patient Position BP Location FiO2 (%)   -- -- -- -- --              Physical Exam  GENERAL: alert, no acute distress  SKIN: Warm, dry  HENT: Normocephalic, atraumatic  EYES: no scleral icterus  CV: regular rhythm, regular rate  RESPIRATORY: normal effort, lungs clear  ABDOMEN: soft, nontender, nondistended  MUSCULOSKELETAL: no deformity  NEURO: alert, moves all extremities, follows commands                                                               LAB RESULTS  Recent Results (from the past 24 hours)   hCG,  Quantitative, Pregnancy    Collection Time: 25  7:28 PM    Specimen: Arm, Left; Blood   Result Value Ref Range    HCG Quantitative 28,418.00 mIU/mL    RhIg Evaluation    Collection Time: 25  7:28 PM    Specimen: Arm, Left; Blood   Result Value Ref Range    ABO Type O     RH type Positive     Antibody Screen Negative    Doses of Rh Immune Globulin    Collection Time: 25  7:28 PM    Specimen: Arm, Left; Blood   Result Value Ref Range    Number of Doses       RhIg is not indicated due to the patient's Rh status   Green Top (Gel)    Collection Time: 25  7:28 PM   Result Value Ref Range    Extra Tube Hold for add-ons.    Lavender Top    Collection Time: 25  7:28 PM   Result Value Ref Range    Extra Tube hold for add-on    Gold Top - SST    Collection Time: 25  7:28 PM   Result Value Ref Range    Extra Tube Hold for add-ons.    Light Blue Top    Collection Time: 25  7:28 PM   Result Value Ref Range    Extra Tube Hold for add-ons.    CBC Auto Differential    Collection Time: 25  7:28 PM    Specimen: Arm, Left; Blood   Result Value Ref Range    WBC 9.15 3.40 - 10.80 10*3/mm3    RBC 4.55 3.77 - 5.28 10*6/mm3    Hemoglobin 12.7 12.0 - 15.9 g/dL    Hematocrit 38.0 34.0 - 46.6 %    MCV 83.5 79.0 - 97.0 fL    MCH 27.9 26.6 - 33.0 pg    MCHC 33.4 31.5 - 35.7 g/dL    RDW 15.3 12.3 - 15.4 %    RDW-SD 46.0 37.0 - 54.0 fl    MPV 10.1 6.0 - 12.0 fL    Platelets 325 140 - 450 10*3/mm3    Neutrophil % 56.1 42.7 - 76.0 %    Lymphocyte % 31.1 19.6 - 45.3 %    Monocyte % 8.3 5.0 - 12.0 %    Eosinophil % 3.3 0.3 - 6.2 %    Basophil % 0.9 0.0 - 1.5 %    Immature Grans % 0.3 0.0 - 0.5 %    Neutrophils, Absolute 5.13 1.70 - 7.00 10*3/mm3    Lymphocytes, Absolute 2.85 0.70 - 3.10 10*3/mm3    Monocytes, Absolute 0.76 0.10 - 0.90 10*3/mm3    Eosinophils, Absolute 0.30 0.00 - 0.40 10*3/mm3    Basophils, Absolute 0.08 0.00 - 0.20 10*3/mm3    Immature Grans, Absolute 0.03 0.00 - 0.05 10*3/mm3     nRBC 0.0 0.0 - 0.2 /100 WBC       I ordered the above labs and independently reviewed the results.        RADIOLOGY  US Ob Transvaginal    Addendum Date: 1/13/2025    ADDENDUM: Patient: ERNESTO CABALLERO  Time Out: 23:14 Exam(s): US OB ENDOVAG  Added by Jack Dumont MD on 1 13 2025 11:14 PM (-05:00) There is subchorionic hemorrhage measuring 1.4 x 0.8 x 1.5 cm.     Result Date: 1/13/2025  Patient: ERNESTO CABALLERO  Time Out: 23:11 Exam(s): US OB ENDOVAG EXAM:   US Pregnancy, Transvaginal CLINICAL HISTORY:    Reason for exam: vaginal bleeding pregnant. TECHNIQUE:   Real-time transvaginal obstetrical ultrasound of the maternal pelvis and a first trimester pregnancy with image documentation.  Transvaginal imaging was used for better evaluation of the fetus and adnexa. COMPARISON:   No relevant prior studies available. FINDINGS:   Gestation:  Single intrauterine gestational sac with crown-rump length measuring 0.4 cm corresponding to 6 weeks and 1 day.  Fetal heart rate is not visualized at this time.   Placenta amniotic fluid:  Cannot be adequately evaluated due to the early gestational age.   Uterus cervix:  The uterus measures 9.4 x 5.4 x 6.1 cm.  No myometrial mass.   Ovaries:  The bilateral ovaries are not visualized secondary to overlying bowel gas.   Free fluid:  No free fluid.   Tubes, lines and devices:  Intrauterine device is noted. IMPRESSION:       Single intrauterine gestational sac with crown-rump length measuring 0. 4 cm corresponding to 6 weeks and 1 day.  Fetal heart rate is not visualized at this time.     Electronically signed by Jack Dumont MD on 01-13-25 at 2311    US Ob Limited 1 + Fetuses    Result Date: 1/13/2025  Patient: ERNESTO CABALLERO  Time Out: 23:14 Exam(s): US OB LIMITED EXAM:   US Pregnancy, Limited CLINICAL HISTORY:    Reason for exam: vaginal bleeding pregnant. TECHNIQUE:   Real-time limited ultrasound of the pregnant maternal uterus with image documentation. COMPARISON:   No relevant  prior studies available. FINDINGS:   Placenta:  There is single intrauterine pregnancy with crown-rump length measuring 0.4 cm corresponding to 6 weeks and 1 day.  Yolk sac is present.  Fetal heart rate is not visualized at this time.  There is subchorionic hemorrhage measuring 1.4 x 0.8 x 1.5 cm.   Uterus:  The uterus measures 9.4 x 5.4 x 6.1 cm.   Adnexa:  The bilateral ovaries are not visualized and likely obscured by overlying bowel gas.   Free fluid:  No free fluid. IMPRESSION:       Single intrauterine pregnancy with crown-rump length measuring 0.4 cm corresponding to 6 weeks and 1 day.  Fetal heart rate is not visualized at this time.  Subchorionic hemorrhage is noted.     Electronically signed by Jack Dumont MD on 25 at 2314     I ordered the above noted radiological studies. Reviewed by me and discussed with radiologist.  See dictation for official radiology interpretation.      PROCEDURES    Procedures      MEDICATIONS GIVEN IN ER    Medications   sodium chloride 0.9 % flush 10 mL (has no administration in time range)         ORDERS PLACED DURING THIS VISIT:  Orders Placed This Encounter   Procedures    US Ob Transvaginal    US Ob Limited 1 + Fetuses    [place] Draw    hCG, Quantitative, Pregnancy    CBC Auto Differential    NPO Diet NPO Type: Strict NPO    Undress & Gown    Vital Signs    Supplies To Bedside - Notify MD When Ready- Pelvic Cart / Set Up     RhIg Evaluation    Doses of Rh Immune Globulin    Insert Peripheral IV    CBC & Differential    Green Top (Gel)    Lavender Top    Gold Top - SST    Light Blue Top         PROGRESS, DATA ANALYSIS, CONSULTS, AND MEDICAL DECISION MAKING    All labs have been independently interpreted by me.  All radiology studies have been reviewed by me and discussed with radiologist dictating the report.   EKG's independently viewed and interpreted by me.  Discussion below represents my analysis of pertinent findings related to patient's condition,  differential diagnosis, treatment plan and final disposition.    Differential diagnosis includes but is not limited to My differential diagnosis for vaginal bleeding in a non-pregnant patient includes but is not limited to:  Premenopausal   fibroids   cervical cancer   adenomyosis   endometriosis   polycystic ovarian syndrome (PCOS)   pelvic inflammatory disease (PID)   IUCD related   o embedded IUCD   o lost IUCD   bryce-pregnancy   o gestational trophoblastic disease   o retained products of conception (RPOC)   o endometritis   systemic diseases   o hyperthyroidism   o hypothyroidism   o thrombocytopenia  Postmenopausal   endometrial polyp   endometrial hyperplasia   endometrial carcinoma   cervical polyp   vaginal cancer   estrogen withdrawal   Fibroids  My differential diagnosis for vaginal bleeding in a pregnant patient includes but is not limited to:  Bleeding in the first trimester   miscarriage   threatened miscarriage: with or without identifiable subchorionic hemorrhage   missed miscarriage   incomplete miscarriage   subchorionic hemorrhage   retained products of conception   ectopic pregnancy   gestational trophoblastic disease   demise of a twin   implantation bleeding  Bleeding in second and third trimesters   placental abruption   placenta previa   gestational trophoblastic disease   vasa previa   uterine rupture        .    Clinical Scores:              ED Course as of 01/14/25 0008   Mon Jan 13, 2025 2221 On first look, this patient is a 24-year-old female, G2, P1, presenting to the emergency room today with vaginal bleeding.  She states that the vaginal bleeding is fairly heavily and reminds her similar to bleeding from her period.  She currently denies any abdominal cramping or lower abdominal pain associated.  Exam shows a benign abdomen with no tenderness nor any rigidity.  Quantitative hCG level is currently at 28,418.  We will obtain a pelvic/transvaginal ultrasound for fetal assessment.  Care  will be assumed by the oncoming provider. [BM]   Tue Jan 14, 2025   0008 I discussed ultrasound findings and lab results with the patient.  I stressed the importance to follow-up with her OB and GYN.  States she has an appointment for the next day or 2. [TJ]      ED Course User Index  [BM] Tushar Karimi MD  [TJ] Ciaran Azul MD               PPE: The patient wore a mask and I wore an N95 mask throughout the entire patient encounter.       AS OF 00:08 EST VITALS:    BP - 126/88  HR - 84  TEMP - 97.2 °F (36.2 °C)  O2 SATS - 100%        DIAGNOSIS  Final diagnoses:   Threatened miscarriage   Subchorionic hematoma in first trimester, single or unspecified fetus         DISPOSITION  ED Disposition       ED Disposition   Discharge    Condition   Stable    Comment   --                  Note Disclaimer: At [facility], we believe that sharing information builds trust and better relationships. You are receiving this note because you recently visited [facility]. It is possible you will see health information before a provider has talked with you about it. This kind of information can be easy to misunderstand. To help you fully understand what it means for your health, we urge you to discuss this note with your provider.         Ciaran Azul MD  01/14/25 0008

## 2025-01-21 ENCOUNTER — OFFICE VISIT (OUTPATIENT)
Dept: OBSTETRICS AND GYNECOLOGY | Age: 25
End: 2025-01-21
Payer: COMMERCIAL

## 2025-01-21 VITALS
WEIGHT: 164 LBS | DIASTOLIC BLOOD PRESSURE: 70 MMHG | BODY MASS INDEX: 25.74 KG/M2 | SYSTOLIC BLOOD PRESSURE: 120 MMHG | HEIGHT: 67 IN

## 2025-01-21 DIAGNOSIS — Z13.89 SCREENING FOR BLOOD OR PROTEIN IN URINE: ICD-10-CM

## 2025-01-21 DIAGNOSIS — O03.9 SAB (SPONTANEOUS ABORTION): Primary | ICD-10-CM

## 2025-01-21 PROBLEM — O46.8X9 SUBCHORIONIC HEMORRHAGE: Status: RESOLVED | Noted: 2025-01-14 | Resolved: 2025-01-21

## 2025-01-21 PROBLEM — O20.9 BLEEDING IN EARLY PREGNANCY: Status: RESOLVED | Noted: 2025-01-14 | Resolved: 2025-01-21

## 2025-01-21 LAB
BILIRUB BLD-MCNC: NEGATIVE MG/DL
GLUCOSE UR STRIP-MCNC: NEGATIVE MG/DL
KETONES UR QL: NEGATIVE
LEUKOCYTE EST, POC: NEGATIVE
NITRITE UR-MCNC: NEGATIVE MG/ML
PH UR: 5.5 [PH] (ref 5–8)
PROT UR STRIP-MCNC: ABNORMAL MG/DL
RBC # UR STRIP: ABNORMAL /UL
SP GR UR: 1.02 (ref 1–1.03)
UROBILINOGEN UR QL: NORMAL

## 2025-01-21 NOTE — PROGRESS NOTES
"Jadiel Gardner is a 24 y.o. female pregnancy confirmation with U/S -LMP 10/17/24 , continuing having some bleeding passing clots.  Ultrasound today shows completed SAB.  Endometrial thickness is 13 mm.  Patient reports bleeding now like a period.  Patient to return in 1 week for ultrasound to assess lining and perform pregnancy test.  Patient advised to report to ED for any heavy significant bleeding or fever.       History of Present Illness    The following portions of the patient's history were reviewed and updated as appropriate: allergies, current medications, past family history, past medical history, past social history, past surgical history, and problem list.    Review of Systems   Constitutional:  Negative for chills, fatigue and fever.   Gastrointestinal:  Negative for abdominal distention and abdominal pain.   Genitourinary:  Positive for vaginal bleeding. Negative for dysuria, pelvic pain, vaginal discharge and vaginal pain.   All other systems reviewed and are negative.  /70   Ht 170.2 cm (67\")   Wt 74.4 kg (164 lb)   LMP 10/27/2024 (Approximate)   BMI 25.69 kg/m²       Objective   Physical Exam  Vitals and nursing note reviewed.   Constitutional:       Appearance: Normal appearance. She is normal weight.   Pulmonary:      Effort: Pulmonary effort is normal.   Neurological:      Mental Status: She is alert and oriented to person, place, and time.   Psychiatric:         Mood and Affect: Mood normal.         Behavior: Behavior normal.         Assessment & Plan   Diagnoses and all orders for this visit:    1. SAB (spontaneous ) (Primary)    2. Screening for blood or protein in urine  -     POC Urinalysis Dipstick         Counseling was given to patient for the following topics: diagnostic results, instructions for management, impressions, and importance of treatment compliance . Total time of the encounter was 20 minutes and 15 minutes was spent counseling, exclusive of " ultrasound results.    Return in about 1 week (around 1/28/2025), or TVUS and f/u with me.

## 2025-01-23 ENCOUNTER — HOSPITAL ENCOUNTER (EMERGENCY)
Facility: HOSPITAL | Age: 25
Discharge: HOME | End: 2025-01-23
Attending: EMERGENCY MEDICINE
Payer: COMMERCIAL

## 2025-01-23 ENCOUNTER — TELEPHONE (OUTPATIENT)
Dept: OBSTETRICS AND GYNECOLOGY | Age: 25
End: 2025-01-23
Payer: COMMERCIAL

## 2025-01-23 ENCOUNTER — APPOINTMENT (OUTPATIENT)
Dept: ULTRASOUND IMAGING | Facility: HOSPITAL | Age: 25
End: 2025-01-23
Payer: COMMERCIAL

## 2025-01-23 VITALS
SYSTOLIC BLOOD PRESSURE: 127 MMHG | HEIGHT: 66 IN | OXYGEN SATURATION: 99 % | TEMPERATURE: 98.5 F | RESPIRATION RATE: 16 BRPM | WEIGHT: 161 LBS | DIASTOLIC BLOOD PRESSURE: 89 MMHG | BODY MASS INDEX: 25.88 KG/M2 | HEART RATE: 100 BPM

## 2025-01-23 DIAGNOSIS — N93.9 VAGINAL BLEEDING: ICD-10-CM

## 2025-01-23 DIAGNOSIS — D62 ACUTE BLOOD LOSS ANEMIA: ICD-10-CM

## 2025-01-23 DIAGNOSIS — R10.31 ACUTE BILATERAL LOWER ABDOMINAL PAIN: Primary | ICD-10-CM

## 2025-01-23 DIAGNOSIS — R10.32 ACUTE BILATERAL LOWER ABDOMINAL PAIN: Primary | ICD-10-CM

## 2025-01-23 LAB
ABO GROUP BLD: NORMAL
ALBUMIN SERPL-MCNC: 4.1 G/DL (ref 3.5–5.2)
ALBUMIN/GLOB SERPL: 1.5 G/DL
ALP SERPL-CCNC: 110 U/L (ref 39–117)
ALT SERPL W P-5'-P-CCNC: 55 U/L (ref 1–33)
ANION GAP SERPL CALCULATED.3IONS-SCNC: 10 MMOL/L (ref 5–15)
AST SERPL-CCNC: 48 U/L (ref 1–32)
BASOPHILS # BLD AUTO: 0.05 10*3/MM3 (ref 0–0.2)
BASOPHILS NFR BLD AUTO: 0.9 % (ref 0–1.5)
BILIRUB SERPL-MCNC: 0.3 MG/DL (ref 0–1.2)
BLD GP AB SCN SERPL QL: NEGATIVE
BUN SERPL-MCNC: 9 MG/DL (ref 6–20)
BUN/CREAT SERPL: 13 (ref 7–25)
CALCIUM SPEC-SCNC: 9 MG/DL (ref 8.6–10.5)
CHLORIDE SERPL-SCNC: 104 MMOL/L (ref 98–107)
CO2 SERPL-SCNC: 24 MMOL/L (ref 22–29)
CREAT SERPL-MCNC: 0.69 MG/DL (ref 0.57–1)
DEPRECATED RDW RBC AUTO: 46 FL (ref 37–54)
EGFRCR SERPLBLD CKD-EPI 2021: 122.9 ML/MIN/1.73
EOSINOPHIL # BLD AUTO: 0.25 10*3/MM3 (ref 0–0.4)
EOSINOPHIL NFR BLD AUTO: 4.3 % (ref 0.3–6.2)
ERYTHROCYTE [DISTWIDTH] IN BLOOD BY AUTOMATED COUNT: 15.1 % (ref 12.3–15.4)
GLOBULIN UR ELPH-MCNC: 2.8 GM/DL
GLUCOSE SERPL-MCNC: 87 MG/DL (ref 65–99)
HCG INTACT+B SERPL-ACNC: 1289 MIU/ML
HCT VFR BLD AUTO: 34.3 % (ref 34–46.6)
HGB BLD-MCNC: 11.1 G/DL (ref 12–15.9)
IMM GRANULOCYTES # BLD AUTO: 0.02 10*3/MM3 (ref 0–0.05)
IMM GRANULOCYTES NFR BLD AUTO: 0.3 % (ref 0–0.5)
LYMPHOCYTES # BLD AUTO: 1.74 10*3/MM3 (ref 0.7–3.1)
LYMPHOCYTES NFR BLD AUTO: 29.8 % (ref 19.6–45.3)
MCH RBC QN AUTO: 27.3 PG (ref 26.6–33)
MCHC RBC AUTO-ENTMCNC: 32.4 G/DL (ref 31.5–35.7)
MCV RBC AUTO: 84.3 FL (ref 79–97)
MONOCYTES # BLD AUTO: 0.51 10*3/MM3 (ref 0.1–0.9)
MONOCYTES NFR BLD AUTO: 8.7 % (ref 5–12)
NEUTROPHILS NFR BLD AUTO: 3.27 10*3/MM3 (ref 1.7–7)
NEUTROPHILS NFR BLD AUTO: 56 % (ref 42.7–76)
NRBC BLD AUTO-RTO: 0 /100 WBC (ref 0–0.2)
PLATELET # BLD AUTO: 248 10*3/MM3 (ref 140–450)
PMV BLD AUTO: 10.9 FL (ref 6–12)
POTASSIUM SERPL-SCNC: 3.8 MMOL/L (ref 3.5–5.2)
PROT SERPL-MCNC: 6.9 G/DL (ref 6–8.5)
RBC # BLD AUTO: 4.07 10*6/MM3 (ref 3.77–5.28)
RH BLD: POSITIVE
SODIUM SERPL-SCNC: 138 MMOL/L (ref 136–145)
T&S EXPIRATION DATE: NORMAL
WBC NRBC COR # BLD AUTO: 5.84 10*3/MM3 (ref 3.4–10.8)

## 2025-01-23 PROCEDURE — 80053 COMPREHEN METABOLIC PANEL: CPT | Performed by: EMERGENCY MEDICINE

## 2025-01-23 PROCEDURE — 86901 BLOOD TYPING SEROLOGIC RH(D): CPT | Performed by: EMERGENCY MEDICINE

## 2025-01-23 PROCEDURE — 36415 COLL VENOUS BLD VENIPUNCTURE: CPT

## 2025-01-23 PROCEDURE — 76817 TRANSVAGINAL US OBSTETRIC: CPT

## 2025-01-23 PROCEDURE — 85025 COMPLETE CBC W/AUTO DIFF WBC: CPT | Performed by: EMERGENCY MEDICINE

## 2025-01-23 PROCEDURE — 84702 CHORIONIC GONADOTROPIN TEST: CPT | Performed by: EMERGENCY MEDICINE

## 2025-01-23 PROCEDURE — 86850 RBC ANTIBODY SCREEN: CPT | Performed by: EMERGENCY MEDICINE

## 2025-01-23 PROCEDURE — 86900 BLOOD TYPING SEROLOGIC ABO: CPT | Performed by: EMERGENCY MEDICINE

## 2025-01-23 PROCEDURE — 99284 EMERGENCY DEPT VISIT MOD MDM: CPT

## 2025-01-23 NOTE — DISCHARGE INSTRUCTIONS
Return to the emergency department immediately if you have any recurrent heavy bleeding, abdominal discomfort, or any other concerning symptoms.  Please follow-up with your primary care provider and your gynecologist as soon as you can.

## 2025-01-23 NOTE — TELEPHONE ENCOUNTER
PT called stating she is having heavy bleeding and clotting. Advised to go to the er per providers message in chart.

## 2025-01-23 NOTE — ED PROVIDER NOTES
[facility]    EMERGENCY DEPARTMENT ENCOUNTER      Pt Name: Yumi Gardner  MRN: [id number]  YOB: 2000  Date of evaluation: 1/23/2025  Provider: Ish Guerrier MD    CHIEF COMPLAINT       Chief Complaint   Patient presents with    Vaginal Bleeding         HISTORY OF PRESENT ILLNESS   Yumi Gardner is a 24 y.o. female who presents to the emergency department with complaint of vaginal bleeding that began about an hour ago.  Patient reports heavy bleeding and passing clots.  Reports having a miscarriage this past Sunday.  Bleeding has now resolved.  She initially had some lower abdominal cramping that has now also resolved.  No vomiting, diarrhea, fever, chills, or urinary symptoms.      Nursing notes were reviewed.    REVIEW OF SYSTEMS     ROS:  A chief complaint appropriate review of systems was completed and is negative except as noted in the HPI.      PAST MEDICAL HISTORY     Past Medical History:   Diagnosis Date    Anxiety 2016    Gestational hypertension     Healthy adult on routine physical examination     Postpartum depression 6/18/2024    Pregnancy 02/06/2024         SURGICAL HISTORY       Past Surgical History:   Procedure Laterality Date    TONSILLECTOMY      WISDOM TOOTH EXTRACTION Bilateral 2020         CURRENT MEDICATIONS     No current facility-administered medications for this encounter.    Current Outpatient Medications:     dilTIAZem HCl (diltiazem 2% and lidocaine 5%), Apply 1 Application topically to the appropriate area as directed As Needed., Disp: , Rfl:     sennosides-docusate (senna-docusate sodium) 8.6-50 MG per tablet, Take 2 tablets by mouth Daily., Disp: 60 tablet, Rfl: 1    ALLERGIES     Adhesive tape    FAMILY HISTORY       Family History   Problem Relation Age of Onset    No Known Problems Father     No Known Problems Mother     Alzheimer's disease Paternal Grandmother     Thyroid cancer Maternal Grandfather     Hearing loss Cousin     Hearing loss Cousin      Breast cancer Neg Hx     Ovarian cancer Neg Hx     Uterine cancer Neg Hx     Colon cancer Neg Hx           SOCIAL HISTORY       Social History     Socioeconomic History    Marital status: Single    Number of children: 0   Tobacco Use    Smoking status: Never     Passive exposure: Never    Smokeless tobacco: Never   Vaping Use    Vaping status: Never Used   Substance and Sexual Activity    Alcohol use: No    Drug use: No    Sexual activity: Yes     Partners: Male     Birth control/protection: None         PHYSICAL EXAM    (up to 7 for level 4, 8 or more for level 5)     Vitals:    01/23/25 1401 01/23/25 1532 01/23/25 1601 01/23/25 1632   BP: 127/95 127/82 123/88 127/89   BP Location:       Patient Position:       Pulse: 101 93 102 100   Resp:       Temp:       TempSrc:       SpO2: 99% 100% 100% 99%   Weight:       Height:           General: Awake, alert, no acute distress.  HEENT: Conjunctivae normal.  Neck: Trachea midline.  Cardiac: Heart regular rate, rhythm, no murmurs, rubs, or gallops  Lungs: Lungs are clear to auscultation, there is no wheezing, rhonchi, or rales. There is no use of accessory muscles.  Chest wall: There is no tenderness to palpation over the chest wall or over ribs  Abdomen: Abdomen is soft, nontender, nondistended. There are no firm or pulsatile masses, no rebound rigidity or guarding.   Musculoskeletal: No deformity.  Neuro: Alert and oriented x 4.  Dermatology: Skin is warm and dry  Psych: Mentation is grossly normal, cognition is grossly normal. Affect is appropriate.        DIAGNOSTIC RESULTS     EKG: All EKGs are interpreted by the Emergency Department Physician who either signs or Co-signs this chart in the absence of a cardiologist.    No orders to display         RADIOLOGY:   [x] Radiologist's Report Reviewed:  US Ob Transvaginal   Final Result   Impression:      1. No acute findings.   2. Small lobulated hyperechoic lesion within the left ovary which has not been seen on prior  studies. This is non-specific but likely of benign etiology, short term follow up can be obtained to assess for stability.             Electronically Signed: Madison Carter MD     1/23/2025 3:19 PM EST     Workstation ID: [id number]          I ordered and independently reviewed the above noted radiographic studies.        LABS:    I have reviewed and interpreted all of the currently available lab results from this visit (if applicable):  Results for orders placed or performed during the hospital encounter of 01/23/25   Comprehensive Metabolic Panel    Collection Time: 01/23/25  2:26 PM    Specimen: Blood   Result Value Ref Range    Glucose 87 65 - 99 mg/dL    BUN 9 6 - 20 mg/dL    Creatinine 0.69 0.57 - 1.00 mg/dL    Sodium 138 136 - 145 mmol/L    Potassium 3.8 3.5 - 5.2 mmol/L    Chloride 104 98 - 107 mmol/L    CO2 24.0 22.0 - 29.0 mmol/L    Calcium 9.0 8.6 - 10.5 mg/dL    Total Protein 6.9 6.0 - 8.5 g/dL    Albumin 4.1 3.5 - 5.2 g/dL    ALT (SGPT) 55 (H) 1 - 33 U/L    AST (SGOT) 48 (H) 1 - 32 U/L    Alkaline Phosphatase 110 39 - 117 U/L    Total Bilirubin 0.3 0.0 - 1.2 mg/dL    Globulin 2.8 gm/dL    A/G Ratio 1.5 g/dL    BUN/Creatinine Ratio 13.0 7.0 - 25.0    Anion Gap 10.0 5.0 - 15.0 mmol/L    eGFR 122.9 >60.0 mL/min/1.73   hCG, Quantitative, Pregnancy    Collection Time: 01/23/25  2:26 PM    Specimen: Blood   Result Value Ref Range    HCG Quantitative 1,289.00 mIU/mL   CBC Auto Differential    Collection Time: 01/23/25  2:26 PM    Specimen: Blood   Result Value Ref Range    WBC 5.84 3.40 - 10.80 10*3/mm3    RBC 4.07 3.77 - 5.28 10*6/mm3    Hemoglobin 11.1 (L) 12.0 - 15.9 g/dL    Hematocrit 34.3 34.0 - 46.6 %    MCV 84.3 79.0 - 97.0 fL    MCH 27.3 26.6 - 33.0 pg    MCHC 32.4 31.5 - 35.7 g/dL    RDW 15.1 12.3 - 15.4 %    RDW-SD 46.0 37.0 - 54.0 fl    MPV 10.9 6.0 - 12.0 fL    Platelets 248 140 - 450 10*3/mm3    Neutrophil % 56.0 42.7 - 76.0 %    Lymphocyte % 29.8 19.6 - 45.3 %    Monocyte % 8.7 5.0 - 12.0 %     Eosinophil % 4.3 0.3 - 6.2 %    Basophil % 0.9 0.0 - 1.5 %    Immature Grans % 0.3 0.0 - 0.5 %    Neutrophils, Absolute 3.27 1.70 - 7.00 10*3/mm3    Lymphocytes, Absolute 1.74 0.70 - 3.10 10*3/mm3    Monocytes, Absolute 0.51 0.10 - 0.90 10*3/mm3    Eosinophils, Absolute 0.25 0.00 - 0.40 10*3/mm3    Basophils, Absolute 0.05 0.00 - 0.20 10*3/mm3    Immature Grans, Absolute 0.02 0.00 - 0.05 10*3/mm3    nRBC 0.0 0.0 - 0.2 /100 WBC   Type & Screen    Collection Time: 01/23/25  3:01 PM    Specimen: Blood   Result Value Ref Range    ABO Type O     RH type Positive     Antibody Screen Negative     T&S Expiration Date 1/26/2025 11:59:59 PM         If labs were ordered, I independently reviewed the results and considered them in treating the patient.      EMERGENCY DEPARTMENT COURSE and DIFFERENTIAL DIAGNOSIS/MDM:   Vitals:  AS OF 13:11 EST    BP - 127/89  HR - 100  TEMP - 98.5 °F (36.9 °C) (Oral)  O2 SATS - 99%        Discussion below represents my analysis of pertinent findings related to patient's condition, differential diagnosis, treatment plan and final disposition.      Differential diagnosis:  The differential diagnosis associated with the patient's presentation includes miscarriage, retained products of conception, uterine atony, anemia, ectopic      Independent interpretations (ECG/rhythm strip/X-ray/US/CT scan): I independently interpreted the patient's cardiac monitor which showed sinus rhythm      Additional sources:  Discussed/obtained information from independent historians:   [] Spouse:   [] Parent:   [] Friend:   [] EMS:   [] Other:  External (non-ED) record review:   [] Inpatient record:   [] Office record:   [] Outpatient record:   [] Prior Outpatient labs:   [] Prior Outpatient radiology:   [] Primary Care record:   [] Outside ED record:   [x] Other: I reviewed transvaginal ultrasound from January 14.  Intrauterine pregnancy measured at 6 weeks with no cardiac activity.      Patient's care impacted  by:   [] Diabetes   [] Hypertension   [] Coronary Artery Disease   [] Cancer   [x] Other: Known miscarriage    Care significantly affected by Social Determinants of Health (housing and economic circumstances, unemployment)    [] Yes     [x] No   If yes, Patient's care significantly limited by  Social Determinants of Health including:    [] Inadequate housing    [] Low income    [] Alcoholism and drug addiction in family    [] Problems related to primary support group    [] Unemployment    [] Problems related to employment    [] Other Social Determinants of Health:       ED Course:    ED Course as of 01/26/25 1311   Thu Jan 23, 2025   1651 On reexamination, patient feels significantly better.  She has no ongoing abdominal cramping and bleeding has completely resolved.  Her ultrasound demonstrates no retained products of conception.  She had a small new area on her left ovary which I informed her of.  She does not have any risk for heterotopic pregnancy.  She is slightly more anemic than she was previously with a hemoglobin of 11.1.  At this point, feel that she is stable for discharge home with close follow-up with her gynecologist.  Discussed that she should return to the emergency department immediately if she has any recurrent abdominal discomfort or heavy bleeding which she understands and agrees to do. [NS]      ED Course User Index  [NS] Ish Guerrier MD             I had a discussion with the patient/family regarding diagnosis, diagnostic results, treatment plan, and medications.  The patient/family indicated understanding of these instructions.  I spent adequate time at the bedside preceding discharge necessary to personally discuss the aftercare instructions, giving patient education, providing explanations of the results of our evaluations/findings, and my decision making to assure that the patient/family understand the plan of care.  Time was allotted to answer questions at that time and throughout  the ED course.  Emphasis was placed on timely follow-up after discharge.  I also discussed the potential for the development of an acute emergent condition requiring further evaluation, admission, or even surgical intervention. I discussed that we found nothing during the visit today indicating the need for further workup, admission, or the presence of an unstable medical condition.  I encouraged the patient to return to the emergency department immediately for ANY concerns, worsening, new complaints, or if symptoms persist and unable to seek follow-up in a timely fashion.  The patient/family expressed understanding and agreement with this plan.  The patient will follow-up with their PCP in 1-2 days for reevaluation.           PROCEDURES:  Procedures    CRITICAL CARE TIME        FINAL IMPRESSION      1. Acute bilateral lower abdominal pain    2. Vaginal bleeding    3. Acute blood loss anemia          DISPOSITION/PLAN     ED Disposition       ED Disposition   Discharge    Condition   Stable    Comment   --                 Comment: Please note this report has been produced using speech recognition software.      Ish Guerrier MD  Attending Emergency Physician             Ish Guerrier MD  01/26/25 [id number]

## 2025-01-23 NOTE — Clinical Note
[facility] EMERGENCY DEPARTMENT  [address]  [facility] [address]  Phone: [phone]    Yumi Gardner was seen and treated in our emergency department on 1/23/2025.  She may return to school on 01/24/2025.          Thank you for choosing [facility].    Kay Tran RN

## 2025-01-28 ENCOUNTER — OFFICE VISIT (OUTPATIENT)
Dept: OBSTETRICS AND GYNECOLOGY | Age: 25
End: 2025-01-28
Payer: COMMERCIAL

## 2025-01-28 VITALS
SYSTOLIC BLOOD PRESSURE: 112 MMHG | DIASTOLIC BLOOD PRESSURE: 68 MMHG | WEIGHT: 161 LBS | BODY MASS INDEX: 25.88 KG/M2 | HEIGHT: 66 IN

## 2025-01-28 DIAGNOSIS — Z13.89 SCREENING FOR BLOOD OR PROTEIN IN URINE: ICD-10-CM

## 2025-01-28 DIAGNOSIS — Z13.9 SPECIAL SCREENING: ICD-10-CM

## 2025-01-28 DIAGNOSIS — F32.9 REACTIVE DEPRESSION: ICD-10-CM

## 2025-01-28 DIAGNOSIS — O03.9 SAB (SPONTANEOUS ABORTION): Primary | ICD-10-CM

## 2025-01-28 LAB
B-HCG UR QL: POSITIVE
BILIRUB BLD-MCNC: NEGATIVE MG/DL
EXPIRATION DATE: ABNORMAL
GLUCOSE UR STRIP-MCNC: NEGATIVE MG/DL
INTERNAL NEGATIVE CONTROL: NEGATIVE
INTERNAL POSITIVE CONTROL: ABNORMAL
KETONES UR QL: NEGATIVE
LEUKOCYTE EST, POC: NEGATIVE
Lab: ABNORMAL
NITRITE UR-MCNC: NEGATIVE MG/ML
PH UR: 6 [PH] (ref 5–8)
PROT UR STRIP-MCNC: NEGATIVE MG/DL
RBC # UR STRIP: ABNORMAL /UL
SP GR UR: 1.02 (ref 1–1.03)
UROBILINOGEN UR QL: NORMAL

## 2025-01-28 NOTE — PROGRESS NOTES
"Subjective   Yumi Gardner is a 24 y.o. female presents for 1 week follow up  for ultrasound to assess lining and perform pregnancy test , sporadic spotting , denies any cramping or heavy bleeding , no fever. Feeling depressed trying to stay busy but her  [place]  believes she is having depression , sadness .  Ultrasound today shows a normal size uterus with thin endometrial lining measuring 3 mm consistent with completed  with no retained products.  Pregnancy test still very faintly positive.  Patient reports depression and agrees that she may need something.  Reports she did take an antidepressant in high school but she does not remember which one.  We will start Zoloft and I will see her back in 4 weeks and we can also repeat pregnancy test at that appointment.   .     History of Present Illness    The following portions of the patient's history were reviewed and updated as appropriate: allergies, current medications, past family history, past medical history, past social history, past surgical history, and problem list.    Review of Systems   Constitutional:  Negative for chills, fatigue and fever.   Gastrointestinal:  Negative for abdominal pain.   Genitourinary:  Negative for menstrual problem, pelvic pain, vaginal bleeding, vaginal discharge and vaginal pain.   Psychiatric/Behavioral:  Positive for dysphoric mood.    All other systems reviewed and are negative.  /68   Ht 167.6 cm (66\")   Wt 73 kg (161 lb)   LMP 10/27/2024 (Approximate)   Breastfeeding No   BMI 25.99 kg/m²       Objective   Physical Exam  Vitals and nursing note reviewed.   Constitutional:       Appearance: Normal appearance. She is normal weight.   Pulmonary:      Effort: Pulmonary effort is normal.   Neurological:      Mental Status: She is alert and oriented to person, place, and time.   Psychiatric:         Mood and Affect: Mood normal.         Behavior: Behavior normal.         Assessment & Plan   Diagnoses " and all orders for this visit:    1. SAB (spontaneous ) (Primary)    2. Special screening  -     POC Pregnancy, Urine    3. Screening for blood or protein in urine  -     POC Urinalysis Dipstick    4. Reactive depression  -     sertraline (Zoloft) 50 MG tablet; Take 1 tablet by mouth Daily.  Dispense: 90 tablet; Refill: 2         Counseling was given to patient and family for the following topics: diagnostic results, instructions for management, impressions, and importance of treatment compliance . Total time of the encounter was 20 minutes and 15 minutes was spent counseling.  Return in about 4 weeks (around 2025), or F/U depression.

## 2025-02-19 ENCOUNTER — TELEPHONE (OUTPATIENT)
Dept: OBSTETRICS AND GYNECOLOGY | Age: 25
End: 2025-02-19
Payer: COMMERCIAL

## 2025-02-25 ENCOUNTER — OFFICE VISIT (OUTPATIENT)
Dept: OBSTETRICS AND GYNECOLOGY | Age: 25
End: 2025-02-25
Payer: COMMERCIAL

## 2025-02-25 VITALS
BODY MASS INDEX: 25.39 KG/M2 | WEIGHT: 158 LBS | SYSTOLIC BLOOD PRESSURE: 108 MMHG | DIASTOLIC BLOOD PRESSURE: 70 MMHG | HEIGHT: 66 IN

## 2025-02-25 DIAGNOSIS — Z13.9 SPECIAL SCREENING: ICD-10-CM

## 2025-02-25 DIAGNOSIS — F32.9 REACTIVE DEPRESSION: Primary | ICD-10-CM

## 2025-02-25 PROBLEM — O03.9 SAB (SPONTANEOUS ABORTION): Status: RESOLVED | Noted: 2025-01-28 | Resolved: 2025-02-25

## 2025-02-25 LAB
B-HCG UR QL: NEGATIVE
EXPIRATION DATE: NORMAL
INTERNAL NEGATIVE CONTROL: NEGATIVE
INTERNAL POSITIVE CONTROL: NORMAL
Lab: NORMAL

## 2025-02-25 NOTE — PROGRESS NOTES
"Subjective   Yumi Gardner is a 24 y.o. female presents for follow up on depression after SAB - zoloft and pregnancy test today , she just had normal period lasted only 3 days ,she is doing really well on zoloft 50, he is feeling  more happy and active. She is scheduled for AC in April 2025     Gynecologic Exam  The patient's pertinent negatives include no pelvic pain or vaginal discharge. Pertinent negatives include no abdominal pain, chills, dysuria or fever.       The following portions of the patient's history were reviewed and updated as appropriate: allergies, current medications, past family history, past medical history, past social history, past surgical history, and problem list.    Review of Systems   Constitutional:  Negative for chills, fatigue and fever.   Gastrointestinal:  Negative for abdominal pain.   Genitourinary:  Negative for dysuria, pelvic pain, vaginal bleeding, vaginal discharge and vaginal pain.   All other systems reviewed and are negative.    /70   Ht 167.6 cm (66\")   Wt 71.7 kg (158 lb)   LMP 02/19/2025 (Exact Date)   Breastfeeding No   BMI 25.50 kg/m²     Objective   Physical Exam  Vitals and nursing note reviewed.   Constitutional:       Appearance: Normal appearance. She is normal weight.   Pulmonary:      Effort: Pulmonary effort is normal.   Neurological:      Mental Status: She is alert and oriented to person, place, and time.   Psychiatric:         Mood and Affect: Mood normal.         Behavior: Behavior normal.         Assessment & Plan   Diagnoses and all orders for this visit:    1. Reactive depression (Primary)    2. Special screening  -     POC Pregnancy, Urine       Counseling was given to patient for the following topics: instructions for management, risk factor reductions, impressions, and importance of treatment compliance . Total time of the encounter was 20 minutes and 15 minutes was spent counseling.             "

## 2025-04-29 ENCOUNTER — OFFICE VISIT (OUTPATIENT)
Dept: OBSTETRICS AND GYNECOLOGY | Age: 25
End: 2025-04-29
Payer: COMMERCIAL

## 2025-04-29 VITALS
HEIGHT: 66 IN | SYSTOLIC BLOOD PRESSURE: 110 MMHG | DIASTOLIC BLOOD PRESSURE: 74 MMHG | WEIGHT: 157 LBS | BODY MASS INDEX: 25.23 KG/M2

## 2025-04-29 DIAGNOSIS — F32.9 REACTIVE DEPRESSION: ICD-10-CM

## 2025-04-29 DIAGNOSIS — Z01.419 ENCOUNTER FOR GYNECOLOGICAL EXAMINATION WITHOUT ABNORMAL FINDING: Primary | ICD-10-CM

## 2025-04-29 DIAGNOSIS — Z11.51 SPECIAL SCREENING EXAMINATION FOR HUMAN PAPILLOMAVIRUS (HPV): ICD-10-CM

## 2025-04-29 DIAGNOSIS — Z12.4 SCREENING FOR MALIGNANT NEOPLASM OF THE CERVIX: ICD-10-CM

## 2025-04-29 PROCEDURE — 2014F MENTAL STATUS ASSESS: CPT | Performed by: OBSTETRICS & GYNECOLOGY

## 2025-04-29 PROCEDURE — 99395 PREV VISIT EST AGE 18-39: CPT | Performed by: OBSTETRICS & GYNECOLOGY

## 2025-04-29 PROCEDURE — 1160F RVW MEDS BY RX/DR IN RCRD: CPT | Performed by: OBSTETRICS & GYNECOLOGY

## 2025-04-29 PROCEDURE — 1159F MED LIST DOCD IN RCRD: CPT | Performed by: OBSTETRICS & GYNECOLOGY

## 2025-04-29 NOTE — PROGRESS NOTES
"Subjective   Yumi Gardner is a 24 y.o. female presents for annual visit today , last pap 10/17/23 neg , contraception none  periods are normal but the last 2 are a week late , no problems today , denies having any vaginal discharge .  Inna is 2 now and doing great.  Patient reports she is doing well on the Zoloft.  Advised patient to let me know if she continues to have irregular menses or intermenstrual bleeding.  She did have a 6-week miscarriage in January of this year.      Gynecologic Exam  The patient's pertinent negatives include no vaginal discharge. Pertinent negatives include no abdominal pain, chills, dysuria or fever.       The following portions of the patient's history were reviewed and updated as appropriate: allergies, current medications, past family history, past medical history, past social history, past surgical history, and problem list.    Review of Systems   Constitutional:  Negative for chills, fatigue and fever.   Gastrointestinal:  Negative for abdominal pain.   Genitourinary:  Positive for menstrual problem. Negative for dysuria, vaginal bleeding, vaginal discharge and vaginal pain.   All other systems reviewed and are negative.    /74   Ht 167.6 cm (66\")   Wt 71.2 kg (157 lb)   LMP 03/22/2025 (Exact Date)   BMI 25.34 kg/m²     Objective   Physical Exam  Vitals and nursing note reviewed.   Constitutional:       Appearance: Normal appearance. She is well-developed and normal weight.   Neck:      Thyroid: No thyromegaly.   Pulmonary:      Effort: Pulmonary effort is normal.   Chest:   Breasts:     Right: No mass, nipple discharge, skin change or tenderness.      Left: No mass, nipple discharge, skin change or tenderness.   Abdominal:      General: There is no distension.      Palpations: Abdomen is soft.      Tenderness: There is no abdominal tenderness.   Genitourinary:     General: Normal vulva.      Exam position: Lithotomy position.      Labia:         Right: No rash or " lesion.         Left: No rash or lesion.       Vagina: Normal. No vaginal discharge or bleeding.      Cervix: No friability or lesion.      Uterus: Not enlarged and not tender.       Adnexa:         Right: No mass or tenderness.          Left: No mass or tenderness.     Musculoskeletal:         General: Normal range of motion.      Cervical back: Normal range of motion.   Skin:     General: Skin is warm and dry.      Findings: No rash.   Neurological:      Mental Status: She is alert and oriented to person, place, and time.   Psychiatric:         Mood and Affect: Mood normal.         Behavior: Behavior normal.           Assessment & Plan   Diagnoses and all orders for this visit:    1. Encounter for gynecological examination without abnormal finding (Primary)    2. Reactive depression      Counseling was given to patient for the following topics: instructions for management, importance of treatment compliance, and self breast exams  .   Return in about 1 year (around 4/29/2026) for Annual physical.

## 2025-05-05 LAB
CONV .: NORMAL
CYTOLOGIST CVX/VAG CYTO: NORMAL
CYTOLOGY CVX/VAG DOC CYTO: NORMAL
CYTOLOGY CVX/VAG DOC THIN PREP: NORMAL
DX ICD CODE: NORMAL
Lab: NORMAL
OTHER STN SPEC: NORMAL
SERVICE CMNT-IMP: NORMAL
STAT OF ADQ CVX/VAG CYTO-IMP: NORMAL

## 2025-05-28 ENCOUNTER — OFFICE VISIT (OUTPATIENT)
Dept: FAMILY MEDICINE CLINIC | Facility: CLINIC | Age: 25
End: 2025-05-28
Payer: COMMERCIAL

## 2025-05-28 VITALS
BODY MASS INDEX: 25.45 KG/M2 | OXYGEN SATURATION: 98 % | DIASTOLIC BLOOD PRESSURE: 84 MMHG | SYSTOLIC BLOOD PRESSURE: 112 MMHG | WEIGHT: 158.38 LBS | HEIGHT: 66 IN | HEART RATE: 78 BPM

## 2025-05-28 DIAGNOSIS — M54.50 LUMBAR PAIN: Primary | ICD-10-CM

## 2025-05-28 LAB
B-HCG UR QL: NEGATIVE
EXPIRATION DATE: NORMAL
INTERNAL NEGATIVE CONTROL: NORMAL
INTERNAL POSITIVE CONTROL: NORMAL
Lab: NORMAL

## 2025-05-28 PROCEDURE — 99213 OFFICE O/P EST LOW 20 MIN: CPT | Performed by: NURSE PRACTITIONER

## 2025-05-28 PROCEDURE — 1126F AMNT PAIN NOTED NONE PRSNT: CPT | Performed by: NURSE PRACTITIONER

## 2025-05-28 PROCEDURE — 1159F MED LIST DOCD IN RCRD: CPT | Performed by: NURSE PRACTITIONER

## 2025-05-28 PROCEDURE — 81025 URINE PREGNANCY TEST: CPT | Performed by: NURSE PRACTITIONER

## 2025-05-28 PROCEDURE — 1160F RVW MEDS BY RX/DR IN RCRD: CPT | Performed by: NURSE PRACTITIONER

## 2025-05-28 RX ORDER — CYCLOBENZAPRINE HCL 10 MG
5 TABLET ORAL 3 TIMES DAILY PRN
Qty: 20 TABLET | Refills: 0 | Status: SHIPPED | OUTPATIENT
Start: 2025-05-28

## 2025-05-28 RX ORDER — PREDNISONE 20 MG/1
TABLET ORAL
Qty: 18 TABLET | Refills: 0 | Status: SHIPPED | OUTPATIENT
Start: 2025-05-28 | End: 2025-06-06

## 2025-05-28 NOTE — PROGRESS NOTES
"Chief Complaint  Back Pain    Subjective          Yumi Gardner presents to [facility]  Back Pain  Associated symptoms: no abdominal pain, no bladder incontinence, no chest pain, no dysuria, no fever, no numbness and no weakness      History of Present Illness  The patient is a 24-year-old female who presents for back pain.    She has been experiencing persistent back pain since 05/24/2025, which is exacerbated by activities such as sitting in the car or lifting her [id number] daughter. The pain appears to be slightly alleviated in the mornings, possibly due to rest at night. It has become so severe that it impedes her ability to engage in her usual activities, including horse riding. She reports no specific incident on 05/23/2025 that could have triggered the onset of this pain. The pain is localized to the lower back and does not radiate into her legs or buttocks. She describes the sensation as a burning one. She reports no tenderness upon palpation of the affected area. She also reports no possibility of pregnancy. Denies breast feeding.    She has a history of a significant back injury at the age of 16 or 17 when she was thrown off a horse, resulting in sciatica nerve damage. This previous injury severely limited her mobility, including her ability to walk or use the bathroom, but eventually improved over time.    Patient Care Team:  Sandro Harden APRN as PCP - General (Nurse Practitioner)  Malinda Martini PA-C as Physician Assistant (Physician Assistant)     Objective   Vital Signs:   /84   Pulse 78   Ht 167.6 cm (66\")   Wt 71.8 kg (158 lb 6 oz)   SpO2 98%   BMI 25.56 kg/m²     Body mass index is 25.56 kg/m².    Review of Systems   Constitutional:  Negative for chills, fatigue and fever.   HENT:  Negative for congestion.    Eyes:  Negative for visual disturbance.   Respiratory:  Negative for cough, shortness of breath and wheezing.    Cardiovascular:  " Negative for chest pain.   Gastrointestinal:  Negative for abdominal pain, constipation, diarrhea, nausea and vomiting.   Genitourinary:  Negative for decreased urine volume, dysuria, frequency, hematuria, urgency and urinary incontinence.   Musculoskeletal:  Positive for back pain.   Skin:  Negative for rash.   Neurological:  Negative for weakness, numbness and headache.       Past History:  Medical History: has a past medical history of Anxiety (2016), Gestational hypertension, Healthy adult on routine physical examination, Postpartum depression (2024), Pregnancy (2024), and SAB (spontaneous ) (2025).   Surgical History: has a past surgical history that includes Ringold tooth extraction (Bilateral, ) and Tonsillectomy.   Family History: family history includes Alzheimer's disease in her paternal grandmother; Hearing loss in her cousin and cousin; No Known Problems in her father and mother; Thyroid cancer in her maternal grandfather.   Social History: reports that she has never smoked. She has never been exposed to tobacco smoke. She has never used smokeless tobacco. She reports that she does not drink alcohol and does not use drugs.    PHQ-2 Depression Screening  Little interest or pleasure in doing things? Not at all   Feeling down, depressed, or hopeless? Not at all   PHQ-2 Total Score 0       PHQ-9 Depression Screening  Little interest or pleasure in doing things? Not at all   Feeling down, depressed, or hopeless? Not at all   PHQ-2 Total Score 0   Trouble falling or staying asleep, or sleeping too much?     Feeling tired or having little energy?     Poor appetite or overeating?     Feeling bad about yourself - or that you are a failure or have let yourself or your family down?     Trouble concentrating on things, such as reading the newspaper or watching television?     Moving or speaking so slowly that other people could have noticed? Or the opposite - being so fidgety or restless  that you have been moving around a lot more than usual?     Thoughts that you would be better off dead, or of hurting yourself in some way?     PHQ-9 Total Score     If you checked off any problems, how difficult have these problems made it for you to do your work, take care of things at home, or get along with other people? Not difficult at all        PHQ-9 Total Score:        Patient screened positive for depression based on a PHQ-9 score of  on . Follow-up recommendations include:          Current Outpatient Medications:     sertraline (Zoloft) 50 MG tablet, Take 1 tablet by mouth Daily., Disp: 90 tablet, Rfl: 2    cyclobenzaprine (FLEXERIL) 10 MG tablet, Take 0.5 tablets by mouth 3 (Three) Times a Day As Needed for Muscle Spasms., Disp: 20 tablet, Rfl: 0    predniSONE (DELTASONE) 20 MG tablet, Take 3 tablets by mouth Daily for 3 days, THEN 2 tablets Daily for 3 days, THEN 1 tablet Daily for 3 days., Disp: 18 tablet, Rfl: 0   (Not in a hospital admission)     Allergies: Adhesive tape    Physical Exam  Constitutional:       Appearance: Normal appearance.   Cardiovascular:      Rate and Rhythm: Normal rate and regular rhythm.      Heart sounds: Normal heart sounds.   Pulmonary:      Effort: Pulmonary effort is normal.      Breath sounds: Normal breath sounds.   Musculoskeletal:      Lumbar back: Tenderness present. No swelling, edema, deformity, lacerations or bony tenderness. Decreased range of motion. Negative right straight leg raise test and negative left straight leg raise test.   Neurological:      General: No focal deficit present.      Mental Status: She is alert and oriented to person, place, and time. Mental status is at baseline.   Psychiatric:         Mood and Affect: Mood normal.         Behavior: Behavior normal.         Thought Content: Thought content normal.         Judgment: Judgment normal.        Physical Exam  Musculoskeletal: Tenderness noted in the lower back, particularly in the middle and  slightly to the side. Range of motion of the legs tested, no significant pain elicited.    Result Review :          Results               Assessment and Plan    Diagnoses and all orders for this visit:    1. Lumbar pain (Primary)  -     XR Spine Lumbar 2 or 3 View (In Office)  -     POC Pregnancy, Urine  -     predniSONE (DELTASONE) 20 MG tablet; Take 3 tablets by mouth Daily for 3 days, THEN 2 tablets Daily for 3 days, THEN 1 tablet Daily for 3 days.  Dispense: 18 tablet; Refill: 0  -     cyclobenzaprine (FLEXERIL) 10 MG tablet; Take 0.5 tablets by mouth 3 (Three) Times a Day As Needed for Muscle Spasms.  Dispense: 20 tablet; Refill: 0      Assessment & Plan  1. Back pain.  - The patient reports back pain that began on 05/24/2025, with no specific injury identified. The pain is localized to the lower back and described as a burning sensation.  - Physical examination reveals tenderness in the lower back and tight hamstrings but no significant pain upon leg raise.  - An x-ray of the lumbar spine will be ordered to evaluate for any loss of disc space or other abnormalities.  - A urine pregnancy test will be conducted prior to the x-ray to rule out pregnancy.  Patient states no breast-feeding.  - Will give a round of prednisone.  Avoid NSAIDs while on prednisone.  Will give Flexeril to take as needed for spasms, monitor for sedation.  Picking up heavy objects and proper body mechanics discussed and encouraged.  Risk of meds discussed and understood.  Education provided.  Return in 1 week if no improvement, sooner if worsens.  If does not improve we would then discuss possible MRI.  She will keep me updated.                    Follow Up   Return if symptoms worsen or fail to improve.  Patient was given instructions and counseling regarding her condition or for health maintenance advice. Please see specific information pulled into the AVS if appropriate.     Patient or patient representative verbalized consent for the  use of Ambient Listening during the visit with  TEDDY Brody for chart documentation. 5/28/2025  15:55 EDT    TEDDY Brody

## 2025-05-30 ENCOUNTER — RESULTS FOLLOW-UP (OUTPATIENT)
Dept: FAMILY MEDICINE CLINIC | Facility: CLINIC | Age: 25
End: 2025-05-30
Payer: COMMERCIAL

## 2025-05-30 NOTE — TELEPHONE ENCOUNTER
SEFERINO and [employer] message with provider message sent.  Per [employer] pt recently accessed [facility] TO RELAY     Please let patient know the radiologist states her lumbar spine x-ray is unremarkable. Continue with current treatment plan. Thanks

## 2025-08-14 ENCOUNTER — HOSPITAL ENCOUNTER (EMERGENCY)
Facility: HOSPITAL | Age: 25
Discharge: HOME | End: 2025-08-15
Attending: EMERGENCY MEDICINE
Payer: COMMERCIAL

## 2025-08-14 DIAGNOSIS — M54.50 ACUTE MIDLINE LOW BACK PAIN WITHOUT SCIATICA: Primary | ICD-10-CM

## 2025-08-14 PROCEDURE — 99283 EMERGENCY DEPT VISIT LOW MDM: CPT

## 2025-08-14 PROCEDURE — 81001 URINALYSIS AUTO W/SCOPE: CPT | Performed by: EMERGENCY MEDICINE

## 2025-08-14 PROCEDURE — 81025 URINE PREGNANCY TEST: CPT | Performed by: EMERGENCY MEDICINE

## 2025-08-15 VITALS
HEART RATE: 78 BPM | WEIGHT: 143 LBS | TEMPERATURE: 98.3 F | DIASTOLIC BLOOD PRESSURE: 79 MMHG | SYSTOLIC BLOOD PRESSURE: 124 MMHG | RESPIRATION RATE: 18 BRPM | HEIGHT: 66 IN | OXYGEN SATURATION: 99 % | BODY MASS INDEX: 22.98 KG/M2

## 2025-08-15 LAB
B-HCG UR QL: NEGATIVE
BACTERIA UR QL AUTO: ABNORMAL /HPF
BILIRUB UR QL STRIP: NEGATIVE
CLARITY UR: CLEAR
COLOR UR: YELLOW
GLUCOSE UR STRIP-MCNC: NEGATIVE MG/DL
HGB UR QL STRIP.AUTO: NEGATIVE
HYALINE CASTS UR QL AUTO: ABNORMAL /LPF
KETONES UR QL STRIP: NEGATIVE
LEUKOCYTE ESTERASE UR QL STRIP.AUTO: ABNORMAL
NITRITE UR QL STRIP: NEGATIVE
PH UR STRIP.AUTO: 8.5 [PH] (ref 5–8)
PROT UR QL STRIP: NEGATIVE
RBC # UR STRIP: ABNORMAL /HPF
REF LAB TEST METHOD: ABNORMAL
SP GR UR STRIP: 1.02 (ref 1–1.03)
SQUAMOUS #/AREA URNS HPF: ABNORMAL /HPF
UROBILINOGEN UR QL STRIP: ABNORMAL
WBC # UR STRIP: ABNORMAL /HPF

## 2025-08-15 PROCEDURE — 63710000001 PREDNISONE PER 1 MG: Performed by: EMERGENCY MEDICINE

## 2025-08-15 RX ORDER — HYDROCODONE BITARTRATE AND ACETAMINOPHEN 5; 325 MG/1; MG/1
TABLET ORAL
Qty: 10 TABLET | Refills: 0 | Status: SHIPPED | OUTPATIENT
Start: 2025-08-15

## 2025-08-15 RX ORDER — PREDNISONE 20 MG/1
TABLET ORAL
Qty: 10 TABLET | Refills: 0 | Status: SHIPPED | OUTPATIENT
Start: 2025-08-15

## 2025-08-15 RX ORDER — METHOCARBAMOL 500 MG/1
500 TABLET, FILM COATED ORAL ONCE
Status: COMPLETED | OUTPATIENT
Start: 2025-08-15 | End: 2025-08-15

## 2025-08-15 RX ORDER — HYDROCODONE BITARTRATE AND ACETAMINOPHEN 5; 325 MG/1; MG/1
1 TABLET ORAL ONCE
Refills: 0 | Status: COMPLETED | OUTPATIENT
Start: 2025-08-15 | End: 2025-08-15

## 2025-08-15 RX ORDER — IBUPROFEN 800 MG/1
800 TABLET, FILM COATED ORAL ONCE
Status: COMPLETED | OUTPATIENT
Start: 2025-08-15 | End: 2025-08-15

## 2025-08-15 RX ORDER — PREDNISONE 20 MG/1
40 TABLET ORAL ONCE
Status: COMPLETED | OUTPATIENT
Start: 2025-08-15 | End: 2025-08-15

## 2025-08-15 RX ORDER — IBUPROFEN 800 MG/1
TABLET, FILM COATED ORAL
Qty: 30 TABLET | Refills: 0 | Status: SHIPPED | OUTPATIENT
Start: 2025-08-15

## 2025-08-15 RX ORDER — METHOCARBAMOL 500 MG/1
500 TABLET, FILM COATED ORAL 4 TIMES DAILY PRN
Qty: 15 TABLET | Refills: 0 | Status: SHIPPED | OUTPATIENT
Start: 2025-08-15

## 2025-08-15 RX ADMIN — IBUPROFEN 800 MG: 800 TABLET, FILM COATED ORAL at 00:24

## 2025-08-15 RX ADMIN — HYDROCODONE BITARTRATE AND ACETAMINOPHEN 1 TABLET: 5; 325 TABLET ORAL at 00:25

## 2025-08-15 RX ADMIN — PREDNISONE 40 MG: 20 TABLET ORAL at 00:24

## 2025-08-15 RX ADMIN — METHOCARBAMOL TABLETS 500 MG: 500 TABLET, COATED ORAL at 00:24

## 2025-08-26 ENCOUNTER — OFFICE VISIT (OUTPATIENT)
Dept: FAMILY MEDICINE CLINIC | Facility: CLINIC | Age: 25
End: 2025-08-26
Payer: COMMERCIAL

## 2025-08-26 VITALS
HEART RATE: 94 BPM | BODY MASS INDEX: 25.88 KG/M2 | OXYGEN SATURATION: 98 % | SYSTOLIC BLOOD PRESSURE: 132 MMHG | WEIGHT: 161 LBS | DIASTOLIC BLOOD PRESSURE: 86 MMHG | HEIGHT: 66 IN

## 2025-08-26 DIAGNOSIS — M54.50 LUMBAR PAIN: Primary | ICD-10-CM

## 2025-08-26 RX ORDER — PREDNISONE 20 MG/1
TABLET ORAL
Qty: 9 TABLET | Refills: 0 | Status: SHIPPED | OUTPATIENT
Start: 2025-08-26